# Patient Record
Sex: FEMALE | Race: WHITE | NOT HISPANIC OR LATINO | Employment: OTHER | URBAN - METROPOLITAN AREA
[De-identification: names, ages, dates, MRNs, and addresses within clinical notes are randomized per-mention and may not be internally consistent; named-entity substitution may affect disease eponyms.]

---

## 2022-05-08 ENCOUNTER — LAB REQUISITION (OUTPATIENT)
Dept: LAB | Facility: CLINIC | Age: 87
End: 2022-05-08
Payer: COMMERCIAL

## 2022-05-08 DIAGNOSIS — I10 ESSENTIAL (PRIMARY) HYPERTENSION: ICD-10-CM

## 2022-05-08 DIAGNOSIS — F33.0 MAJOR DEPRESSIVE DISORDER, RECURRENT, MILD (H): ICD-10-CM

## 2022-05-10 LAB
BASOPHILS # BLD AUTO: 0.1 10E3/UL (ref 0–0.2)
BASOPHILS NFR BLD AUTO: 1 %
EOSINOPHIL # BLD AUTO: 0.2 10E3/UL (ref 0–0.7)
EOSINOPHIL NFR BLD AUTO: 4 %
ERYTHROCYTE [DISTWIDTH] IN BLOOD BY AUTOMATED COUNT: 14.3 % (ref 10–15)
HCT VFR BLD AUTO: 44.8 % (ref 35–47)
HGB BLD-MCNC: 15.1 G/DL (ref 11.7–15.7)
IMM GRANULOCYTES # BLD: 0 10E3/UL
IMM GRANULOCYTES NFR BLD: 0 %
LYMPHOCYTES # BLD AUTO: 1.1 10E3/UL (ref 0.8–5.3)
LYMPHOCYTES NFR BLD AUTO: 21 %
MCH RBC QN AUTO: 32.8 PG (ref 26.5–33)
MCHC RBC AUTO-ENTMCNC: 33.7 G/DL (ref 31.5–36.5)
MCV RBC AUTO: 97 FL (ref 78–100)
MONOCYTES # BLD AUTO: 0.5 10E3/UL (ref 0–1.3)
MONOCYTES NFR BLD AUTO: 10 %
NEUTROPHILS # BLD AUTO: 3.5 10E3/UL (ref 1.6–8.3)
NEUTROPHILS NFR BLD AUTO: 64 %
NRBC # BLD AUTO: 0 10E3/UL
NRBC BLD AUTO-RTO: 0 /100
PLATELET # BLD AUTO: 205 10E3/UL (ref 150–450)
RBC # BLD AUTO: 4.6 10E6/UL (ref 3.8–5.2)
RETICS # AUTO: 0.07 10E6/UL (ref 0.01–0.11)
RETICS/RBC NFR AUTO: 1.6 % (ref 0.8–2.7)
WBC # BLD AUTO: 5.4 10E3/UL (ref 4–11)

## 2022-05-10 PROCEDURE — P9603 ONE-WAY ALLOW PRORATED MILES: HCPCS | Mod: ORL

## 2022-05-10 PROCEDURE — 85045 AUTOMATED RETICULOCYTE COUNT: CPT | Mod: ORL

## 2022-05-10 PROCEDURE — 85025 COMPLETE CBC W/AUTO DIFF WBC: CPT | Mod: ORL

## 2022-05-10 PROCEDURE — 36415 COLL VENOUS BLD VENIPUNCTURE: CPT | Mod: ORL

## 2022-05-11 LAB
PATH REPORT.COMMENTS IMP SPEC: NORMAL
PATH REPORT.COMMENTS IMP SPEC: NORMAL
PATH REPORT.FINAL DX SPEC: NORMAL
PATH REPORT.MICROSCOPIC SPEC OTHER STN: NORMAL
PATH REPORT.MICROSCOPIC SPEC OTHER STN: NORMAL
PATH REPORT.RELEVANT HX SPEC: NORMAL

## 2022-05-11 PROCEDURE — 85060 BLOOD SMEAR INTERPRETATION: CPT | Performed by: PATHOLOGY

## 2022-06-11 ENCOUNTER — HEALTH MAINTENANCE LETTER (OUTPATIENT)
Age: 87
End: 2022-06-11

## 2022-06-17 ENCOUNTER — TELEPHONE (OUTPATIENT)
Dept: GERIATRICS | Facility: CLINIC | Age: 87
End: 2022-06-17
Payer: COMMERCIAL

## 2022-06-17 NOTE — TELEPHONE ENCOUNTER
Medication/Dose: ONDANSETRON 8MG ODT. DISSOLVE ONE TABLET BETWEEN CHEEK EVERY 8 HOURS PRN    Insurance Name: BC/GHISLAINE HANCOCK  Insurance ID: [id number]  BIN: [id number]  PCN: [id number]  Insurance Phone Number: [phone]    Brady Kimbolton  Pharmacy Technician  [facility]  [address], [place] [address]  Phone: [phone]  Fax: [phone]

## 2022-06-17 NOTE — TELEPHONE ENCOUNTER
Prior Authorization Update-    Patient is not being following by [facility]. Medication is also not in the patient chart as there are no medications listed. Encounter has been closed and the sender/PA team has been informed to please contact the patient's current primary care team for follow-up.

## 2022-06-21 ENCOUNTER — ASSISTED LIVING VISIT (OUTPATIENT)
Dept: GERIATRICS | Facility: CLINIC | Age: 87
End: 2022-06-21
Payer: COMMERCIAL

## 2022-06-21 VITALS — HEIGHT: 64 IN

## 2022-06-21 DIAGNOSIS — G47.09 OTHER INSOMNIA: ICD-10-CM

## 2022-06-21 DIAGNOSIS — F33.9 DEPRESSION, RECURRENT (H): ICD-10-CM

## 2022-06-21 DIAGNOSIS — G47.33 OSA (OBSTRUCTIVE SLEEP APNEA): ICD-10-CM

## 2022-06-21 DIAGNOSIS — Z85.3 HX: BREAST CANCER: ICD-10-CM

## 2022-06-21 DIAGNOSIS — K58.9 IRRITABLE BOWEL SYNDROME, UNSPECIFIED TYPE: ICD-10-CM

## 2022-06-21 DIAGNOSIS — K21.00 GASTROESOPHAGEAL REFLUX DISEASE WITH ESOPHAGITIS WITHOUT HEMORRHAGE: ICD-10-CM

## 2022-06-21 DIAGNOSIS — I10 PRIMARY HYPERTENSION: ICD-10-CM

## 2022-06-21 DIAGNOSIS — R29.6 FALLS FREQUENTLY: ICD-10-CM

## 2022-06-21 DIAGNOSIS — R41.3 MEMORY PROBLEM: Primary | ICD-10-CM

## 2022-06-21 RX ORDER — SERTRALINE HYDROCHLORIDE 25 MG/1
25 TABLET, FILM COATED ORAL DAILY
COMMUNITY
Start: 2022-06-15 | End: 2022-06-24

## 2022-06-21 RX ORDER — ACETAMINOPHEN 500 MG
500-1000 TABLET ORAL
COMMUNITY
End: 2022-09-16

## 2022-06-21 RX ORDER — ONDANSETRON 8 MG/1
8 TABLET, ORALLY DISINTEGRATING ORAL EVERY 8 HOURS PRN
COMMUNITY
Start: 2022-06-05 | End: 2022-09-19

## 2022-06-21 RX ORDER — LANOLIN ALCOHOL/MO/W.PET/CERES
3 CREAM (GRAM) TOPICAL AT BEDTIME
COMMUNITY
Start: 2022-06-15 | End: 2022-06-24

## 2022-06-21 NOTE — LETTER
6/21/2022        RE: Yanelis David  [address]  [place] [address]        M [facility] GERIATRICS  INITIAL VISIT NOTE  June 21, 2022      PRIMARY CARE PROVIDER AND CLINIC:  Erica Shaw [address] [facility] / [place] [address]    M [facility] Medical Record Number:  [id number]  Place of Service where encounter took place:  [facility]) [[id number]]    Chief Complaint   Patient presents with     [facility] Care       HPI:    Yanelis David is a 89 year old  (1/22/1933) female was admitted to the above facility from [facility].      History obtained from: facility chart records, facility staff, patient report, [facility] chart review and Care Everywhere [facility] chart review.      HPI:  Patient in room with two daughters. Up independently but uses 2 wheel walker for longer distances. Pleasant with writer and reports she is healthy. Has history of depression, family is concerned regarding socialization and patient potentially staying in room. Taking sertraline 25 mg/day. With recent falls, has left pinky finger fracture. Is healing and pain is minimum.       CODE STATUS/ADVANCE DIRECTIVES: DNR / DNI    ALLERGIES:  Allergies   Allergen Reactions     Codeine      Other reaction(s): GI Upset     Contrast Dye Hives and Rash     Iodine Hives and Rash     Latex Rash     Other reaction(s): Intolerance-Can't Take  RED RASH, BANDAIDS OR TAPE OR GLOVES         PAST MEDICAL HISTORY:   Past Medical History:   Diagnosis Date     COPD (chronic obstructive pulmonary disease) (H) 9/4/2014     Depression, recurrent (H) 4/6/2021     DVT (deep venous thrombosis) (H) 8/9/2013    Formatting of this note might be different from the original. 30's with bilateral lower extremity, unclear if provoked vs unprovoked. She states that she had erythema nodosum in her 30's. She could not remember if she has superficial thrombophlebitis vs deep vein thrombosis.     Esophageal  reflux 12/31/2007    Formatting of this note might be different from the original. Dexilant before eating  On Prilosec.  Had upper endoscopy done showing polyp but no barretts     Generalized osteoarthritis 11/13/2008     Hyperlipidemia 12/31/2007    Formatting of this note might be different from the original. Diet controlled, on fish oil daily     Hypokalemia 5/11/2015     IBS (irritable bowel syndrome) 11/13/2008    Formatting of this note might be different from the original. Predominant diarrhea and constipation     Lumbago 4/29/2013     Malignant neoplasm of breast (H) 8/20/2014    Formatting of this note might be different from the original. right breast, s/p lumpectomy in 12/2013, on oral med.     Melanoma of skin (H) 4/20/2015     Memory problem 4/6/2021     SANTANA (obstructive sleep apnea) 8/5/2013    Formatting of this note might be different from the original. On CPAP     Other insomnia 9/10/2019     Pain in thoracic spine 4/29/2013       PAST SURGICAL HISTORY:   No past surgical history on file.    FAMILY HISTORY:   No family history on file.  Unable to review due to cognitive impairment- mild memory loss.     SOCIAL HISTORY:   Patient's living condition: assisted living     MEDICATIONS  Post Discharge Medication Reconciliation Status: discharge medications reconciled, continue medications without change.  Current Outpatient Medications   Medication Sig Dispense Refill     acetaminophen (TYLENOL) 500 MG tablet Take 500-1,000 mg by mouth       calcium carbonate 600 mg-vitamin D 400 units (CALCIUM CARB-CHOLECALCIFEROL) 600-400 MG-UNIT per tablet Take 1 tablet by mouth daily       melatonin 3 MG tablet Take 3 mg by mouth At Bedtime       ondansetron (ZOFRAN ODT) 8 MG ODT tab Take 8 mg by mouth every 8 hours as needed       sertraline (ZOLOFT) 25 MG tablet Take 25 mg by mouth daily         ROS:  10 point ROS neg other than the symptoms noted above in the HPI.  Unable to obtain due to cognitive impairment or  "aphasia  Review of Systems   All other systems reviewed and are negative.      PHYSICAL EXAM:  Ht 1.626 m (5' 4\")   Physical Exam  Vitals reviewed.   Constitutional:       Appearance: Normal appearance.   Cardiovascular:      Rate and Rhythm: Normal rate and regular rhythm.      Pulses: Normal pulses.      Heart sounds: Normal heart sounds.   Pulmonary:      Effort: Pulmonary effort is normal.      Breath sounds: Normal breath sounds.   Abdominal:      General: Bowel sounds are normal.      Palpations: Abdomen is soft.   Skin:     General: Skin is warm.   Neurological:      Mental Status: She is alert. Mental status is at baseline. She is confused.   Psychiatric:         Cognition and Memory: Cognition is impaired.          LABORATORY/IMAGING DATA:  Reviewed as per [email or web address] and/or [facility]    ASSESSMENT/PLAN:  Depression (R33.9)  Comment: longstanding history of depression. With self isolating.   Plan: Continue sertraline 25 mg/day. Monitor and update NP with changes.     Memory Problem (R41.3)  Comment: Chronic, mild cognitive impairment.   Plan: Continue AL support with medication, meals and safety. Expect further decline with progression of disease.     Insomnia (G47.09)  Comment: Longstanding history. Improved with medication.   Plan: no change at this time. Continue Melatonin 3 mg/HS    Breast Cancer (Z85.3)  Comment: right breast. S/P lumpectomy 12/2013.   Plan: Surveillance    SANTANA (G47.33)  Comment: on CPAP  Plan: Monitor     IBS (K58.9)  Comment: predominant diarrhea and constipation  Plan: Monitor, no change     GERD (K21.00)  Comment: Had upper endoscopy showing polyp but no barretts  Plan: PPI discontinued. Asymptomatic off meds    Frequent Falls (R29.6)   Comment: recent falls with left hand pinky fracture.   Plan: Continue OT/PT.         Orders:  CBC and BMP next lab day       Electronically signed by:  Erica Shaw NP                    Sincerely,        Erica Shaw NP    "

## 2022-06-21 NOTE — PROGRESS NOTES
[facility] GERIATRICS  INITIAL VISIT NOTE  June 21, 2022      PRIMARY CARE PROVIDER AND CLINIC:  Erica Shaw [address] [facility] [address]    [facility] Medical Record Number:  [id number]  [facility] [[id number]]    Chief Complaint   Patient presents with     [facility] Care       HPI:    Yanelis David is a 89 year old  (1/22/1933) female was admitted to the above facility from [facility].      History obtained from: facility chart records, facility staff, patient report, [facility] chart review and Care Everywhere [facility] chart review.      HPI:  Patient in room with two daughters. Up independently but uses 2 wheel walker for longer distances. Pleasant with writer and reports she is healthy. Has history of depression, family is concerned regarding socialization and patient potentially staying in room. Taking sertraline 25 mg/day. With recent falls, has left pinky finger fracture. Is healing and pain is minimum.       CODE STATUS/ADVANCE DIRECTIVES: DNR / DNI    ALLERGIES:  Allergies   Allergen Reactions     Codeine      Other reaction(s): GI Upset     Contrast Dye Hives and Rash     Iodine Hives and Rash     Latex Rash     Other reaction(s): Intolerance-Can't Take  RED RASH, BANDAIDS OR TAPE OR GLOVES         PAST MEDICAL HISTORY:   Past Medical History:   Diagnosis Date     COPD (chronic obstructive pulmonary disease) (H) 9/4/2014     Depression, recurrent (H) 4/6/2021     DVT (deep venous thrombosis) (H) 8/9/2013    Formatting of this note might be different from the original. 30's with bilateral lower extremity, unclear if provoked vs unprovoked. She states that she had erythema nodosum in her 30's. She could not remember if she has superficial thrombophlebitis vs deep vein thrombosis.     Esophageal reflux 12/31/2007    Formatting of this note might be different from the original. Dexilant before  eating  On Prilosec.  Had upper endoscopy done showing polyp but no barretts     Generalized osteoarthritis 11/13/2008     Hyperlipidemia 12/31/2007    Formatting of this note might be different from the original. Diet controlled, on fish oil daily     Hypokalemia 5/11/2015     IBS (irritable bowel syndrome) 11/13/2008    Formatting of this note might be different from the original. Predominant diarrhea and constipation     Lumbago 4/29/2013     Malignant neoplasm of breast (H) 8/20/2014    Formatting of this note might be different from the original. right breast, s/p lumpectomy in 12/2013, on oral med.     Melanoma of skin (H) 4/20/2015     Memory problem 4/6/2021     SANTANA (obstructive sleep apnea) 8/5/2013    Formatting of this note might be different from the original. On CPAP     Other insomnia 9/10/2019     Pain in thoracic spine 4/29/2013       PAST SURGICAL HISTORY:   No past surgical history on file.    FAMILY HISTORY:   No family history on file.  Unable to review due to cognitive impairment- mild memory loss.     SOCIAL HISTORY:   Patient's living condition: assisted living     MEDICATIONS  Post Discharge Medication Reconciliation Status: discharge medications reconciled, continue medications without change.  Current Outpatient Medications   Medication Sig Dispense Refill     acetaminophen (TYLENOL) 500 MG tablet Take 500-1,000 mg by mouth       calcium carbonate 600 mg-vitamin D 400 units (CALCIUM CARB-CHOLECALCIFEROL) 600-400 MG-UNIT per tablet Take 1 tablet by mouth daily       melatonin 3 MG tablet Take 3 mg by mouth At Bedtime       ondansetron (ZOFRAN ODT) 8 MG ODT tab Take 8 mg by mouth every 8 hours as needed       sertraline (ZOLOFT) 25 MG tablet Take 25 mg by mouth daily         ROS:  10 point ROS neg other than the symptoms noted above in the HPI.  Unable to obtain due to cognitive impairment or aphasia  Review of Systems   All other systems reviewed and are negative.      PHYSICAL EXAM:  Ht  "1.626 m (5' 4\")   Physical Exam  Vitals reviewed.   Constitutional:       Appearance: Normal appearance.   Cardiovascular:      Rate and Rhythm: Normal rate and regular rhythm.      Pulses: Normal pulses.      Heart sounds: Normal heart sounds.   Pulmonary:      Effort: Pulmonary effort is normal.      Breath sounds: Normal breath sounds.   Abdominal:      General: Bowel sounds are normal.      Palpations: Abdomen is soft.   Skin:     General: Skin is warm.   Neurological:      Mental Status: She is alert. Mental status is at baseline. She is confused.   Psychiatric:         Cognition and Memory: Cognition is impaired.          LABORATORY/IMAGING DATA:  Reviewed as per [facility] and/or [facility]    ASSESSMENT/PLAN:  Depression (R33.9)  Comment: longstanding history of depression. With self isolating.   Plan: Continue sertraline 25 mg/day. Monitor and update NP with changes.     Memory Problem (R41.3)  Comment: Chronic, mild cognitive impairment.   Plan: Continue AL support with medication, meals and safety. Expect further decline with progression of disease.     Insomnia (G47.09)  Comment: Longstanding history. Improved with medication.   Plan: no change at this time. Continue Melatonin 3 mg/HS    Breast Cancer (Z85.3)  Comment: right breast. S/P lumpectomy 12/2013.   Plan: Surveillance    SANTANA (G47.33)  Comment: on CPAP  Plan: Monitor     IBS (K58.9)  Comment: predominant diarrhea and constipation  Plan: Monitor, no change     GERD (K21.00)  Comment: Had upper endoscopy showing polyp but no barretts  Plan: PPI discontinued. Asymptomatic off meds    Frequent Falls (R29.6)   Comment: recent falls with left hand pinky fracture.   Plan: Continue OT/PT.         Orders:  CBC and BMP next lab day       Electronically signed by:  Erica Shaw NP              "

## 2022-06-24 ENCOUNTER — LAB REQUISITION (OUTPATIENT)
Dept: LAB | Facility: CLINIC | Age: 87
End: 2022-06-24
Payer: COMMERCIAL

## 2022-06-24 DIAGNOSIS — U07.1 COVID-19: ICD-10-CM

## 2022-06-24 DIAGNOSIS — F33.9 DEPRESSION, RECURRENT (H): Primary | ICD-10-CM

## 2022-06-24 DIAGNOSIS — G47.09 OTHER INSOMNIA: ICD-10-CM

## 2022-06-24 DIAGNOSIS — Z78.9 TAKES DIETARY SUPPLEMENTS: ICD-10-CM

## 2022-06-24 PROCEDURE — U0005 INFEC AGEN DETEC AMPLI PROBE: HCPCS | Mod: ORL | Performed by: INTERNAL MEDICINE

## 2022-06-24 RX ORDER — SERTRALINE HYDROCHLORIDE 25 MG/1
TABLET, FILM COATED ORAL
Qty: 90 TABLET | Refills: 97 | Status: SHIPPED | OUTPATIENT
Start: 2022-06-24 | End: 2022-07-26 | Stop reason: DRUGHIGH

## 2022-06-24 RX ORDER — LANOLIN ALCOHOL/MO/W.PET/CERES
CREAM (GRAM) TOPICAL
Qty: 90 TABLET | Refills: 97 | Status: SHIPPED | OUTPATIENT
Start: 2022-06-24

## 2022-06-24 RX ORDER — CALCIUM CARBONATE/VITAMIN D3 600 MG-10
TABLET ORAL
Qty: 90 TABLET | Refills: 97 | Status: SHIPPED | OUTPATIENT
Start: 2022-06-24 | End: 2023-06-30

## 2022-06-25 LAB — SARS-COV-2 RNA RESP QL NAA+PROBE: NEGATIVE

## 2022-06-26 ENCOUNTER — LAB REQUISITION (OUTPATIENT)
Dept: LAB | Facility: CLINIC | Age: 87
End: 2022-06-26
Payer: COMMERCIAL

## 2022-06-26 DIAGNOSIS — R79.9 ABNORMAL FINDING OF BLOOD CHEMISTRY, UNSPECIFIED: ICD-10-CM

## 2022-06-27 ENCOUNTER — LAB REQUISITION (OUTPATIENT)
Dept: LAB | Facility: CLINIC | Age: 87
End: 2022-06-27
Payer: COMMERCIAL

## 2022-06-27 DIAGNOSIS — U07.1 COVID-19: ICD-10-CM

## 2022-06-28 LAB
ANION GAP SERPL CALCULATED.3IONS-SCNC: 11 MMOL/L (ref 5–18)
BUN SERPL-MCNC: 18 MG/DL (ref 8–28)
CALCIUM SERPL-MCNC: 9.6 MG/DL (ref 8.5–10.5)
CHLORIDE BLD-SCNC: 105 MMOL/L (ref 98–107)
CO2 SERPL-SCNC: 23 MMOL/L (ref 22–31)
CREAT SERPL-MCNC: 0.91 MG/DL (ref 0.6–1.1)
ERYTHROCYTE [DISTWIDTH] IN BLOOD BY AUTOMATED COUNT: 13.5 % (ref 10–15)
GFR SERPL CREATININE-BSD FRML MDRD: 60 ML/MIN/1.73M2
GLUCOSE BLD-MCNC: 82 MG/DL (ref 70–125)
HCT VFR BLD AUTO: 45.6 % (ref 35–47)
HGB BLD-MCNC: 15.5 G/DL (ref 11.7–15.7)
MCH RBC QN AUTO: 32.4 PG (ref 26.5–33)
MCHC RBC AUTO-ENTMCNC: 34 G/DL (ref 31.5–36.5)
MCV RBC AUTO: 95 FL (ref 78–100)
PLATELET # BLD AUTO: 200 10E3/UL (ref 150–450)
POTASSIUM BLD-SCNC: 3.9 MMOL/L (ref 3.5–5)
RBC # BLD AUTO: 4.78 10E6/UL (ref 3.8–5.2)
SODIUM SERPL-SCNC: 139 MMOL/L (ref 136–145)
WBC # BLD AUTO: 6.4 10E3/UL (ref 4–11)

## 2022-06-28 PROCEDURE — 85027 COMPLETE CBC AUTOMATED: CPT | Mod: ORL | Performed by: NURSE PRACTITIONER

## 2022-06-28 PROCEDURE — 80048 BASIC METABOLIC PNL TOTAL CA: CPT | Mod: ORL | Performed by: NURSE PRACTITIONER

## 2022-06-28 PROCEDURE — P9603 ONE-WAY ALLOW PRORATED MILES: HCPCS | Mod: ORL | Performed by: NURSE PRACTITIONER

## 2022-06-28 PROCEDURE — 36415 COLL VENOUS BLD VENIPUNCTURE: CPT | Mod: ORL | Performed by: NURSE PRACTITIONER

## 2022-06-28 PROCEDURE — U0005 INFEC AGEN DETEC AMPLI PROBE: HCPCS | Mod: ORL | Performed by: INTERNAL MEDICINE

## 2022-06-29 LAB — SARS-COV-2 RNA RESP QL NAA+PROBE: NEGATIVE

## 2022-07-20 ENCOUNTER — ASSISTED LIVING VISIT (OUTPATIENT)
Dept: GERIATRICS | Facility: CLINIC | Age: 87
End: 2022-07-20
Payer: COMMERCIAL

## 2022-07-20 VITALS — TEMPERATURE: 97.4 F | HEIGHT: 64 IN

## 2022-07-20 DIAGNOSIS — I10 BENIGN ESSENTIAL HYPERTENSION: ICD-10-CM

## 2022-07-20 DIAGNOSIS — F03.B0 MODERATE DEMENTIA WITHOUT BEHAVIORAL DISTURBANCE (H): Primary | ICD-10-CM

## 2022-07-20 DIAGNOSIS — R13.10 DYSPHAGIA, UNSPECIFIED TYPE: ICD-10-CM

## 2022-07-20 DIAGNOSIS — J43.8 OTHER EMPHYSEMA (H): ICD-10-CM

## 2022-07-20 DIAGNOSIS — F32.0 CURRENT MILD EPISODE OF MAJOR DEPRESSIVE DISORDER WITHOUT PRIOR EPISODE (H): ICD-10-CM

## 2022-07-20 NOTE — LETTER
"    2022        RE: Yanelis David  [address]  [place] [address]        M [facility] GERIATRICS  ACUTE/EPISODIC VISIT    [facility] Medical Record Number:  [id number]  Place of Service where encounter took place:  [facility] ([facility]) [[id number]]    Chief Complaint   Patient presents with     RECHECK       HPI:    Yanelis Dvaid is a 89 year old  (1933), who is being seen today for an episodic care visit.  HPI information obtained from: facility chart records, facility staff and patient report.    Today's concern is: Patient eating breakfast alone in room, just has gotten up for the day. Per staff, she had a self reported fall without injury. Unable to recall any details for writer. Family concerned that she is more depressed. Today, reported her spouse has  3-4 weeks ago. Having difficulty thinking clearly and noted \"I just can't think today\". Family requesting speech therapy as she was suppose to get a swallow evaluation due to coughing while eating, but this was discontinued due to fall and placement into [place] or [place]. She is denying this service and feels she has no swallow problems and wouldn't change anything if she did. Denies CP, SOB or lightheadedness.     BP Readings from Last 3 Encounters:   No data found for BP     Pulse Readings from Last 4 Encounters:   No data found for Pulse     Wt Readings from Last 4 Encounters:   No data found for Wt            ALLERGIES:    Allergies   Allergen Reactions     Codeine      Other reaction(s): GI Upset     Contrast Dye Hives and Rash     Iodine Hives and Rash     Latex Rash     Other reaction(s): Intolerance-Can't Take  RED RASH, BANDAIDS OR TAPE OR GLOVES          MEDICATIONS:  Post Discharge Medication Reconciliation Status: patient was not discharged from an inpatient facility or TCU.     Current Outpatient Medications   Medication Sig Dispense Refill     acetaminophen (TYLENOL) 500 MG tablet Take " "500-1,000 mg by mouth       CALCIUM + VITAMIN D3 600-10 MG-MCG TABS per tablet TAKE 1 TABLET BY MOUTH ONCE DAILY 90 tablet 97     melatonin 3 MG tablet TAKE 1 TABLET BY MOUTH AT BEDTIME 90 tablet 97     ondansetron (ZOFRAN ODT) 8 MG ODT tab Take 8 mg by mouth every 8 hours as needed       sertraline (ZOLOFT) 25 MG tablet TAKE 1 TABLET BY MOUTH ONCE DAILY 90 tablet 97     Medications reviewed:  Medications reconciled to facility chart and changes were made to reflect current medications as identified as above med list. Below are the changes that were made:   Medications stopped since last EPIC medication reconciliation:   There are no discontinued medications.    Medications started since last [facility] medication reconciliation:  No orders of the defined types were placed in this encounter.        REVIEW OF SYSTEMS:  4 point ROS neg other than the symptoms noted above in the HPI.  Review of Systems   Neurological:        Reports confusion       PHYSICAL EXAM:  Temp 97.4  F (36.3  C)   Ht 1.626 m (5' 4\")   Physical Exam  Vitals reviewed.   Cardiovascular:      Rate and Rhythm: Normal rate.   Pulmonary:      Effort: Pulmonary effort is normal.      Breath sounds: Normal breath sounds.   Skin:     General: Skin is warm.   Neurological:      Mental Status: She is alert. She is confused.   Psychiatric:         Mood and Affect: Affect is flat.           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   Plan:   -Continue AL support with medication administration, meals and safety.   -expect further decline with progression of disease     (J43.8) Other emphysema (H)  Comment: Chronic, without recent illness.   Plan:   - Monitor     (F32.0) Current mild episode of major depressive disorder without prior episode (H)  Comment: Longstanding; increased weepiness   Plan:   -Increase sertraline 50 " mg/day     (R13.10) Dysphagia, unspecified type  Comment: Undiagnosed. Coughs with eating.   Plan:   -Refused speech therapy.     (110) HTN  Comment: Not managed, hypertensive without symptoms. Not stable.   144/95  Plan: Lisinopril 2.5 mg/day       Orders:  1. Lisinopril 2/5 mg/day  2. Sertraline 50 mg/day   3. BP daily x2 weeks    Electronically signed by  Eirca Shaw NP                Sincerely,        Erica Shaw NP

## 2022-07-20 NOTE — PROGRESS NOTES
"[facility] GERIATRICS  ACUTE/EPISODIC VISIT    [facility] Medical Record Number:  [id number]  Place of Service where encounter took place:  [facility] ([facility]) [[id number]]    Chief Complaint   Patient presents with     RECHECK       HPI:    Yanelis David is a 89 year old  (1933), who is being seen today for an episodic care visit.  HPI information obtained from: facility chart records, facility staff and patient report.    Today's concern is: Patient eating breakfast alone in room, just has gotten up for the day. Per staff, she had a self reported fall without injury. Unable to recall any details for writer. Family concerned that she is more depressed. Today, reported her spouse has  3-4 weeks ago. Having difficulty thinking clearly and noted \"I just can't think today\". Family requesting speech therapy as she was suppose to get a swallow evaluation due to coughing while eating, but this was discontinued due to fall and placement into [place] or [place]. She is denying this service and feels she has no swallow problems and wouldn't change anything if she did. Denies CP, SOB or lightheadedness.     BP Readings from Last 3 Encounters:   No data found for BP     Pulse Readings from Last 4 Encounters:   No data found for Pulse     Wt Readings from Last 4 Encounters:   No data found for Wt            ALLERGIES:    Allergies   Allergen Reactions     Codeine      Other reaction(s): GI Upset     Contrast Dye Hives and Rash     Iodine Hives and Rash     Latex Rash     Other reaction(s): Intolerance-Can't Take  RED RASH, BANDAIDS OR TAPE OR GLOVES          MEDICATIONS:  Post Discharge Medication Reconciliation Status: patient was not discharged from an inpatient facility or TCU.     Current Outpatient Medications   Medication Sig Dispense Refill     acetaminophen (TYLENOL) 500 MG tablet Take 500-1,000 mg by mouth       CALCIUM + VITAMIN D3 600-10 MG-MCG TABS per tablet TAKE 1 TABLET BY " "MOUTH ONCE DAILY 90 tablet 97     melatonin 3 MG tablet TAKE 1 TABLET BY MOUTH AT BEDTIME 90 tablet 97     ondansetron (ZOFRAN ODT) 8 MG ODT tab Take 8 mg by mouth every 8 hours as needed       sertraline (ZOLOFT) 25 MG tablet TAKE 1 TABLET BY MOUTH ONCE DAILY 90 tablet 97     Medications reviewed:  Medications reconciled to facility chart and changes were made to reflect current medications as identified as above med list. Below are the changes that were made:   Medications stopped since last EPIC medication reconciliation:   There are no discontinued medications.    Medications started since last [facility] medication reconciliation:  No orders of the defined types were placed in this encounter.        REVIEW OF SYSTEMS:  4 point ROS neg other than the symptoms noted above in the HPI.  Review of Systems   Neurological:        Reports confusion       PHYSICAL EXAM:  Temp 97.4  F (36.3  C)   Ht 1.626 m (5' 4\")   Physical Exam  Vitals reviewed.   Cardiovascular:      Rate and Rhythm: Normal rate.   Pulmonary:      Effort: Pulmonary effort is normal.      Breath sounds: Normal breath sounds.   Skin:     General: Skin is warm.   Neurological:      Mental Status: She is alert. She is confused.   Psychiatric:         Mood and Affect: Affect is flat.           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   Plan:   -Continue AL support with medication administration, meals and safety.   -expect further decline with progression of disease     (J43.8) Other emphysema (H)  Comment: Chronic, without recent illness.   Plan:   - Monitor     (F32.0) Current mild episode of major depressive disorder without prior episode (H)  Comment: Longstanding; increased weepiness   Plan:   -Increase sertraline 50 mg/day     (R13.10) Dysphagia, unspecified type  Comment: Undiagnosed. Coughs with eating.   Plan: "   -Refused speech therapy.     (110) HTN  Comment: Not managed, hypertensive without symptoms. Not stable.   144/95  Plan: Lisinopril 2.5 mg/day       Orders:  1. Lisinopril 2/5 mg/day  2. Sertraline 50 mg/day   3. BP daily x2 weeks    Electronically signed by  Erica Shaw NP

## 2022-07-21 DIAGNOSIS — I10 BENIGN ESSENTIAL HYPERTENSION: Primary | ICD-10-CM

## 2022-07-21 DIAGNOSIS — F33.9 DEPRESSION, RECURRENT (H): ICD-10-CM

## 2022-07-22 PROCEDURE — 87086 URINE CULTURE/COLONY COUNT: CPT | Mod: ORL | Performed by: NURSE PRACTITIONER

## 2022-07-22 PROCEDURE — 81001 URINALYSIS AUTO W/SCOPE: CPT | Mod: ORL | Performed by: NURSE PRACTITIONER

## 2022-07-22 RX ORDER — LISINOPRIL 2.5 MG/1
TABLET ORAL
Qty: 90 TABLET | Refills: 97 | Status: SHIPPED | OUTPATIENT
Start: 2022-07-22 | End: 2022-11-30

## 2022-07-23 ENCOUNTER — LAB REQUISITION (OUTPATIENT)
Dept: LAB | Facility: CLINIC | Age: 87
End: 2022-07-23
Payer: COMMERCIAL

## 2022-07-23 DIAGNOSIS — N39.0 URINARY TRACT INFECTION, SITE NOT SPECIFIED: ICD-10-CM

## 2022-07-23 LAB
ALBUMIN UR-MCNC: NEGATIVE MG/DL
APPEARANCE UR: ABNORMAL
BILIRUB UR QL STRIP: NEGATIVE
COLOR UR AUTO: ABNORMAL
GLUCOSE UR STRIP-MCNC: NEGATIVE MG/DL
HGB UR QL STRIP: NEGATIVE
KETONES UR STRIP-MCNC: NEGATIVE MG/DL
LEUKOCYTE ESTERASE UR QL STRIP: ABNORMAL
NITRATE UR QL: NEGATIVE
PH UR STRIP: 5.5 [PH] (ref 5–7)
RBC URINE: 0 /HPF
SP GR UR STRIP: 1.03 (ref 1–1.03)
UROBILINOGEN UR STRIP-MCNC: NORMAL MG/DL
WBC URINE: 0 /HPF

## 2022-07-24 LAB — BACTERIA UR CULT: NORMAL

## 2022-07-26 PROBLEM — F03.B0 MODERATE DEMENTIA WITHOUT BEHAVIORAL DISTURBANCE (H): Status: ACTIVE | Noted: 2022-07-26

## 2022-07-26 RX ORDER — LISINOPRIL 2.5 MG/1
2.5 TABLET ORAL DAILY
Start: 2022-07-26 | End: 2022-07-26

## 2022-08-23 ENCOUNTER — ASSISTED LIVING VISIT (OUTPATIENT)
Dept: GERIATRICS | Facility: CLINIC | Age: 87
End: 2022-08-23
Payer: COMMERCIAL

## 2022-08-23 VITALS
HEART RATE: 90 BPM | RESPIRATION RATE: 18 BRPM | HEIGHT: 64 IN | TEMPERATURE: 97.6 F | SYSTOLIC BLOOD PRESSURE: 100 MMHG | DIASTOLIC BLOOD PRESSURE: 67 MMHG

## 2022-08-23 DIAGNOSIS — M62.81 GENERALIZED MUSCLE WEAKNESS: ICD-10-CM

## 2022-08-23 DIAGNOSIS — R05.9 COUGH: Primary | ICD-10-CM

## 2022-08-23 DIAGNOSIS — F03.B0 MODERATE DEMENTIA WITHOUT BEHAVIORAL DISTURBANCE (H): ICD-10-CM

## 2022-08-23 NOTE — LETTER
8/23/2022        RE: Yanelis David  [address]  [place] [address]        [facility] GERIATRICS  ACUTE/EPISODIC VISIT    [facility] Medical Record Number:  [id number]  [facility] [[id number]]    Chief Complaint   Patient presents with     RECHECK       HPI:    Yanelis David is a 89 year old  (1/22/1933), who is being seen today for an episodic care visit.  HPI information obtained from: facility chart records, facility staff and patient report.    Today's concern is: Per nursing staff and documentation, patient has been coughing with meals, especially coffee. This is somewhat worse and progressing. Functional and cognitive decline noted with increased weakness. Family requesting physical, occupational and speech therapy. HPI difficult to obtain 2/2 severe cognitive impairment.     BP Readings from Last 3 Encounters:   08/31/22 129/77   08/23/22 100/67     Pulse Readings from Last 4 Encounters:   08/31/22 90   08/23/22 90     Wt Readings from Last 4 Encounters:   08/31/22 80.4 kg (177 lb 3.2 oz)       ALLERGIES:    Allergies   Allergen Reactions     Codeine      Other reaction(s): GI Upset     Contrast Dye Hives and Rash     Iodine Hives and Rash     Latex Rash     Other reaction(s): Intolerance-Can't Take  RED RASH, BANDAIDS OR TAPE OR GLOVES          MEDICATIONS:  Post Discharge Medication Reconciliation Status: patient was not discharged from an inpatient facility or TCU.     Current Outpatient Medications   Medication Sig Dispense Refill     acetaminophen (TYLENOL) 500 MG tablet Take 500-1,000 mg by mouth       CALCIUM + VITAMIN D3 600-10 MG-MCG TABS per tablet TAKE 1 TABLET BY MOUTH ONCE DAILY 90 tablet 97     lisinopril (ZESTRIL) 2.5 MG tablet TAKE 1 TABLET BY MOUTH ONCE DAILY 90 tablet 97     melatonin 3 MG tablet TAKE 1 TABLET BY MOUTH AT BEDTIME 90 tablet 97     ondansetron (ZOFRAN ODT) 8 MG ODT tab Take 8  "mg by mouth every 8 hours as needed       sertraline (ZOLOFT) 50 MG tablet TAKE 1 TABLET BY MOUTH ONCE DAILY 90 tablet 97     Medications reviewed:  Medications reconciled to facility chart and changes were made to reflect current medications as identified as above med list. Below are the changes that were made:   Medications stopped since last EPIC medication reconciliation:   There are no discontinued medications.    Medications started since last [facility] medication reconciliation:  No orders of the defined types were placed in this encounter.    REVIEW OF SYSTEMS:  4 point ROS neg other than the symptoms noted above in the HPI.  Unable to be obtained due to cognitive impairment or aphasia.   Review of Systems   All other systems reviewed and are negative.      PHYSICAL EXAM:  /67   Pulse 90   Temp 97.6  F (36.4  C)   Resp 18   Ht 1.626 m (5' 4\")   Physical Exam  Cardiovascular:      Rate and Rhythm: Normal rate and regular rhythm.      Pulses: Normal pulses.      Heart sounds: Normal heart sounds.   Abdominal:      General: Bowel sounds are normal.      Palpations: Abdomen is soft.   Musculoskeletal:      Comments: Generalized weakness     Skin:     General: Skin is warm.   Neurological:      Mental Status: She is alert.   Psychiatric:         Mood and Affect: Mood normal.         ASSESSMENT / PLAN:    ICD-10-CM    1. Cough  R05.9    2. Generalized muscle weakness  M62.81    3. Moderate dementia without behavioral disturbance (H)  F03.90      Cognitive impairment chronic and progressing. Now demonstrating functional and cognitive decline. Coughing with meals and increased weakness.     Occupational therapy    Physical therapy     Speech therapy       Orders:  OT/PT/SP    Electronically signed by  Erica Shaw NP      Documentation of Face to Face and Certification for Home Health Services    I certify that patient: Yanelis David is under my care and that I, or a nurse practitioner or physician's " assistant working with me, had a face-to-face encounter that meets the physician face-to-face encounter requirements with this patient on: 8/23/2022.    This encounter with the patient was in whole, or in part, for the following medical condition, which is the primary reason for home health care: Cognitive and functional decline with marked weakness and coughing with meals.    I certify that, based on my findings, the following services are medically necessary home health services: Occupational Therapy, Physical Therapy and Speech Language Therapy.    My clinical findings support the need for the above services because: Occupational Therapy Services are needed to assess and treat cognitive ability and address ADL safety due to impairment in weakness., Physical Therapy Services are needed to assess and treat the following functional impairments: fucntional decline, weakness. and Speech Therapy Services are needed to assess and treat impairments in language and/or swallow functions due to coughing with oral intake.    Further, I certify that my clinical findings support that this patient is homebound (i.e. absences from home require considerable and taxing effort and are for medical reasons or Sabianist services or infrequently or of short duration when for other reasons) because: Requires assistance of another person or specialized equipment to access medical services because patient: Requires supervision of another for safe transfer...    Based on the above findings. I certify that this patient is confined to the home and needs intermittent skilled nursing care, physical therapy and/or speech therapy.  The patient is under my care, and I have initiated the establishment of the plan of care.  This patient will be followed by a physician who will periodically review the plan of care.  Physician/Provider to provide follow up care: Erica Shaw    Attending hospital physician (the Medicare certified [place] provider):  Erica Shaw NP  Physician Signature: See electronic signature associated with these discharge orders.  Date: 9/4/2022                Sincerely,        Erica Shaw NP

## 2022-08-23 NOTE — PROGRESS NOTES
[facility] GERIATRICS  ACUTE/EPISODIC VISIT    [facility] Medical Record Number:  [id number]  Place of Service where encounter took place:  [facility] [[id number]]    Chief Complaint   Patient presents with     RECHECK       HPI:    Yanelis David is a 89 year old  (1/22/1933), who is being seen today for an episodic care visit.  HPI information obtained from: facility chart records, facility staff and patient report.    Today's concern is: Per nursing staff and documentation, patient has been coughing with meals, especially coffee. This is somewhat worse and progressing. Functional and cognitive decline noted with increased weakness. Family requesting physical, occupational and speech therapy. HPI difficult to obtain 2/2 severe cognitive impairment.     BP Readings from Last 3 Encounters:   08/31/22 129/77   08/23/22 100/67     Pulse Readings from Last 4 Encounters:   08/31/22 90   08/23/22 90     Wt Readings from Last 4 Encounters:   08/31/22 80.4 kg (177 lb 3.2 oz)       ALLERGIES:    Allergies   Allergen Reactions     Codeine      Other reaction(s): GI Upset     Contrast Dye Hives and Rash     Iodine Hives and Rash     Latex Rash     Other reaction(s): Intolerance-Can't Take  RED RASH, BANDAIDS OR TAPE OR GLOVES          MEDICATIONS:  Post Discharge Medication Reconciliation Status: patient was not discharged from an inpatient facility or TCU.     Current Outpatient Medications   Medication Sig Dispense Refill     acetaminophen (TYLENOL) 500 MG tablet Take 500-1,000 mg by mouth       CALCIUM + VITAMIN D3 600-10 MG-MCG TABS per tablet TAKE 1 TABLET BY MOUTH ONCE DAILY 90 tablet 97     lisinopril (ZESTRIL) 2.5 MG tablet TAKE 1 TABLET BY MOUTH ONCE DAILY 90 tablet 97     melatonin 3 MG tablet TAKE 1 TABLET BY MOUTH AT BEDTIME 90 tablet 97     ondansetron (ZOFRAN ODT) 8 MG ODT tab Take 8 mg by mouth every 8 hours as needed       sertraline (ZOLOFT) 50 MG tablet TAKE 1 TABLET BY MOUTH  "ONCE DAILY 90 tablet 97     Medications reviewed:  Medications reconciled to facility chart and changes were made to reflect current medications as identified as above med list. Below are the changes that were made:   Medications stopped since last EPIC medication reconciliation:   There are no discontinued medications.    Medications started since last [facility] medication reconciliation:  No orders of the defined types were placed in this encounter.    REVIEW OF SYSTEMS:  4 point ROS neg other than the symptoms noted above in the HPI.  Unable to be obtained due to cognitive impairment or aphasia.   Review of Systems   All other systems reviewed and are negative.      PHYSICAL EXAM:  /67   Pulse 90   Temp 97.6  F (36.4  C)   Resp 18   Ht 1.626 m (5' 4\")   Physical Exam  Cardiovascular:      Rate and Rhythm: Normal rate and regular rhythm.      Pulses: Normal pulses.      Heart sounds: Normal heart sounds.   Abdominal:      General: Bowel sounds are normal.      Palpations: Abdomen is soft.   Musculoskeletal:      Comments: Generalized weakness     Skin:     General: Skin is warm.   Neurological:      Mental Status: She is alert.   Psychiatric:         Mood and Affect: Mood normal.         ASSESSMENT / PLAN:    ICD-10-CM    1. Cough  R05.9    2. Generalized muscle weakness  M62.81    3. Moderate dementia without behavioral disturbance (H)  F03.90      Cognitive impairment chronic and progressing. Now demonstrating functional and cognitive decline. Coughing with meals and increased weakness.     Occupational therapy    Physical therapy     Speech therapy       Orders:  OT/PT/SP    Electronically signed by  Erica Shaw NP      Documentation of Face to Face and Certification for Home Health Services    I certify that patient: Yanelis David is under my care and that I, or a nurse practitioner or physician's assistant working with me, had a face-to-face encounter that meets the physician face-to-face " encounter requirements with this patient on: 8/23/2022.    This encounter with the patient was in whole, or in part, for the following medical condition, which is the primary reason for home health care: Cognitive and functional decline with marked weakness and coughing with meals.    I certify that, based on my findings, the following services are medically necessary home health services: Occupational Therapy, Physical Therapy and Speech Language Therapy.    My clinical findings support the need for the above services because: Occupational Therapy Services are needed to assess and treat cognitive ability and address ADL safety due to impairment in weakness., Physical Therapy Services are needed to assess and treat the following functional impairments: fucntional decline, weakness. and Speech Therapy Services are needed to assess and treat impairments in language and/or swallow functions due to coughing with oral intake.    Further, I certify that my clinical findings support that this patient is homebound (i.e. absences from home require considerable and taxing effort and are for medical reasons or Alevism services or infrequently or of short duration when for other reasons) because: Requires assistance of another person or specialized equipment to access medical services because patient: Requires supervision of another for safe transfer...    Based on the above findings. I certify that this patient is confined to the home and needs intermittent skilled nursing care, physical therapy and/or speech therapy.  The patient is under my care, and I have initiated the establishment of the plan of care.  This patient will be followed by a physician who will periodically review the plan of care.  Physician/Provider to provide follow up care: Erica Shaw    Attending hospital physician (the Medicare certified [place] provider): Erica Shaw NP  Physician Signature: See electronic signature associated with these  discharge orders.  Date: 9/4/2022

## 2022-08-26 ENCOUNTER — DOCUMENTATION ONLY (OUTPATIENT)
Dept: OTHER | Facility: CLINIC | Age: 87
End: 2022-08-26

## 2022-08-31 ENCOUNTER — ASSISTED LIVING VISIT (OUTPATIENT)
Dept: GERIATRICS | Facility: CLINIC | Age: 87
End: 2022-08-31
Payer: COMMERCIAL

## 2022-08-31 VITALS
TEMPERATURE: 97.8 F | DIASTOLIC BLOOD PRESSURE: 77 MMHG | BODY MASS INDEX: 30.25 KG/M2 | WEIGHT: 177.2 LBS | HEART RATE: 90 BPM | SYSTOLIC BLOOD PRESSURE: 129 MMHG | RESPIRATION RATE: 19 BRPM | HEIGHT: 64 IN | OXYGEN SATURATION: 91 %

## 2022-08-31 DIAGNOSIS — M62.81 GENERALIZED MUSCLE WEAKNESS: ICD-10-CM

## 2022-08-31 DIAGNOSIS — I10 BENIGN ESSENTIAL HYPERTENSION: Primary | ICD-10-CM

## 2022-08-31 DIAGNOSIS — F03.B0 MODERATE DEMENTIA WITHOUT BEHAVIORAL DISTURBANCE (H): ICD-10-CM

## 2022-08-31 DIAGNOSIS — F33.9 DEPRESSION, RECURRENT (H): ICD-10-CM

## 2022-08-31 DIAGNOSIS — M54.9 ACUTE BACK PAIN, UNSPECIFIED BACK LOCATION, UNSPECIFIED BACK PAIN LATERALITY: ICD-10-CM

## 2022-08-31 DIAGNOSIS — G47.09 OTHER INSOMNIA: ICD-10-CM

## 2022-08-31 NOTE — LETTER
"    8/31/2022        RE: Yanelis David  [address]  [place] [address]        Yanelis David is a 89 year old female seen August 31, 2022 at [facility] where she has resided for 2 months (admit 6/2022) seen for initial visit.   Pt is seen in her apartment up to recliner, with caregiver present.    Ambulatory with 4WW in her apartment, uses cane or WC when she goes out with family.   Has PCA for morning and evening cares.     Nursing staff had reported back pain, her daughter Sophia said someone came and did an adjustment  Pt states:  \"I feel fine\"   No back pain today.    \"My memory isn't good.\"  Only complaint on today's visit.   By chart review, pt has a h/o HTN, depression, GERD, SANTANA and IBS.  She was dx'd with breast cancer in 2013, s/p lumpectomy and oral adjuvant therapy.   Memory loss noted in 2021. Pt had been living with her  in [place] until his death in March 2022.  Moved to [place] to be closer to family.          Past Medical History:   Diagnosis Date     COPD (chronic obstructive pulmonary disease) (H) 9/4/2014     Depression, recurrent (H) 4/6/2021     DVT (deep venous thrombosis) (H) 8/9/2013    Formatting of this note might be different from the original. 30's with bilateral lower extremity, unclear if provoked vs unprovoked. She states that she had erythema nodosum in her 30's. She could not remember if she has superficial thrombophlebitis vs deep vein thrombosis.     Esophageal reflux 12/31/2007    Formatting of this note might be different from the original. Dexilant before eating  On Prilosec.  Had upper endoscopy done showing polyp but no barretts     Generalized osteoarthritis 11/13/2008     Hyperlipidemia 12/31/2007    Formatting of this note might be different from the original. Diet controlled, on fish oil daily     Hypokalemia 5/11/2015     IBS (irritable bowel syndrome) 11/13/2008    Formatting of this note might be different from the original. " "Predominant diarrhea and constipation     Lumbago 2013     Malignant neoplasm of breast (H) 2014    Formatting of this note might be different from the original. right breast, s/p lumpectomy in 2013, on oral med.     Melanoma of skin (H) 2015     Memory problem 2021     SANTANA (obstructive sleep apnea) 2013    Formatting of this note might be different from the original. On CPAP     Other insomnia 9/10/2019     Pain in thoracic spine 2013     SH:  Previously lived at [facility] in [place]      Two daughters, two sons, all live locally     and sister  in 2022  Daughter Sophia called during visit.     ROS: [place], has hearing aids but can't get them in herself    Ambulatory with 4WW, needs assist for ADLs      EXAM: NAD  /77   Pulse 90   Temp 97.8  F (36.6  C)   Resp 19   Ht 1.626 m (5' 4\")   Wt 80.4 kg (177 lb 3.2 oz)   SpO2 91%   BMI 30.42 kg/m     Neck supple without adenopathy  Lungs decreased BS right base, clear left  Heart RRR  Ext with trace edema  +ulnar deformities bilaterally   Neuro: some disorientation, STML.   Ambulates with 4WW, steady gait, slow to rise from chair   Psych: affect pleasant     Last Comprehensive Metabolic Panel:  Sodium   Date Value Ref Range Status   2022 139 136 - 145 mmol/L Final     Potassium   Date Value Ref Range Status   2022 3.9 3.5 - 5.0 mmol/L Final     Chloride   Date Value Ref Range Status   2022 105 98 - 107 mmol/L Final     Carbon Dioxide (CO2)   Date Value Ref Range Status   2022 23 22 - 31 mmol/L Final     Anion Gap   Date Value Ref Range Status   2022 11 5 - 18 mmol/L Final     Glucose   Date Value Ref Range Status   2022 82 70 - 125 mg/dL Final     Urea Nitrogen   Date Value Ref Range Status   2022 18 8 - 28 mg/dL Final     Creatinine   Date Value Ref Range Status   2022 0.91 0.60 - 1.10 mg/dL Final     GFR Estimate   Date Value Ref Range Status "   06/28/2022 60 (L) >60 mL/min/1.73m2 Final     Comment:     Effective December 21, 2021 eGFRcr in adults is calculated using the 2021 CKD-EPI creatinine equation which includes age and gender (Sera et al., NEJM, DOI: 10.1056/EHPItb0700160)     Calcium   Date Value Ref Range Status   06/28/2022 9.6 8.5 - 10.5 mg/dL Final     Lab Results   Component Value Date    WBC 6.4 06/28/2022      HGB 15.5 06/28/2022      MCV 95 06/28/2022       06/28/2022        IMP/PLAN:   (I10) Benign essential hypertension  Comment: good control   Plan: lisinopril 2.5 mg/day        (F33.9) Depression, recurrent (H)  Comment: by hx  Plan: sertraline 50 mg/day       (M54.9) Acute back pain, unspecified back location, unspecified back pain laterality  (M62.81) Generalized muscle weakness  Comment: pain improved per pt today    Plan: [facility] PHYSICAL THERAPY / OCCUPATIONAL THERAPY for transfers, gait, strengthening  Acetaminophen PRN     (F03.90) Moderate dementia without behavioral disturbance  Comment: cognitive and physical decline over past year   Plan: AL support for med admin, meals, activity; along with family and PCA assistance       (G47.09) Other insomnia  Comment: improved   Plan: melatonin 3 mg/HS       Bridgett Carreon MD         Sincerely,        Bridgett Carreon MD

## 2022-08-31 NOTE — PROGRESS NOTES
"Yanelis David is a 89 year old female seen August 31, 2022 at [facility] where she has resided for 2 months (admit 6/2022) seen for initial visit.   Pt is seen in her apartment up to recliner, with caregiver present.    Ambulatory with 4WW in her apartment, uses cane or WC when she goes out with family.   Has PCA for morning and evening cares.     Nursing staff had reported back pain, her daughter Sophia said someone came and did an adjustment  Pt states:  \"I feel fine\"   No back pain today.    \"My memory isn't good.\"  Only complaint on today's visit.   By chart review, pt has a h/o HTN, depression, GERD, SANTANA and IBS.  She was dx'd with breast cancer in 2013, s/p lumpectomy and oral adjuvant therapy.   Memory loss noted in 2021. Pt had been living with her  in [place] until his death in March 2022.  Moved to [place] to be closer to family.          Past Medical History:   Diagnosis Date     COPD (chronic obstructive pulmonary disease) (H) 9/4/2014     Depression, recurrent (H) 4/6/2021     DVT (deep venous thrombosis) (H) 8/9/2013    Formatting of this note might be different from the original. 30's with bilateral lower extremity, unclear if provoked vs unprovoked. She states that she had erythema nodosum in her 30's. She could not remember if she has superficial thrombophlebitis vs deep vein thrombosis.     Esophageal reflux 12/31/2007    Formatting of this note might be different from the original. Dexilant before eating  On Prilosec.  Had upper endoscopy done showing polyp but no barretts     Generalized osteoarthritis 11/13/2008     Hyperlipidemia 12/31/2007    Formatting of this note might be different from the original. Diet controlled, on fish oil daily     Hypokalemia 5/11/2015     IBS (irritable bowel syndrome) 11/13/2008    Formatting of this note might be different from the original. Predominant diarrhea and constipation     Lumbago 4/29/2013     Malignant neoplasm of breast (H) " "2014    Formatting of this note might be different from the original. right breast, s/p lumpectomy in 2013, on oral med.     Melanoma of skin (H) 2015     Memory problem 2021     SANTANA (obstructive sleep apnea) 2013    Formatting of this note might be different from the original. On CPAP     Other insomnia 9/10/2019     Pain in thoracic spine 2013     SH:  Previously lived at [facility] in [place]      Two daughters, two sons, all live locally     and sister  in 2022  Daughter Sophia called during visit.     ROS: [place], has hearing aids but can't get them in herself    Ambulatory with 4WW, needs assist for ADLs      EXAM: NAD  /77   Pulse 90   Temp 97.8  F (36.6  C)   Resp 19   Ht 1.626 m (5' 4\")   Wt 80.4 kg (177 lb 3.2 oz)   SpO2 91%   BMI 30.42 kg/m     Neck supple without adenopathy  Lungs decreased BS right base, clear left  Heart RRR  Ext with trace edema  +ulnar deformities bilaterally   Neuro: some disorientation, STML.   Ambulates with 4WW, steady gait, slow to rise from chair   Psych: affect pleasant     Last Comprehensive Metabolic Panel:  Sodium   Date Value Ref Range Status   2022 139 136 - 145 mmol/L Final     Potassium   Date Value Ref Range Status   2022 3.9 3.5 - 5.0 mmol/L Final     Chloride   Date Value Ref Range Status   2022 105 98 - 107 mmol/L Final     Carbon Dioxide (CO2)   Date Value Ref Range Status   2022 23 22 - 31 mmol/L Final     Anion Gap   Date Value Ref Range Status   2022 11 5 - 18 mmol/L Final     Glucose   Date Value Ref Range Status   2022 82 70 - 125 mg/dL Final     Urea Nitrogen   Date Value Ref Range Status   2022 18 8 - 28 mg/dL Final     Creatinine   Date Value Ref Range Status   2022 0.91 0.60 - 1.10 mg/dL Final     GFR Estimate   Date Value Ref Range Status   2022 60 (L) >60 mL/min/1.73m2 Final     Comment:     Effective 2021 eGFRcr in adults " is calculated using the 2021 CKD-EPI creatinine equation which includes age and gender (Sera et al., NE, DOI: 10.1056/ZANZjm3322605)     Calcium   Date Value Ref Range Status   06/28/2022 9.6 8.5 - 10.5 mg/dL Final     Lab Results   Component Value Date    WBC 6.4 06/28/2022      HGB 15.5 06/28/2022      MCV 95 06/28/2022       06/28/2022        IMP/PLAN:   (I10) Benign essential hypertension  Comment: good control   Plan: lisinopril 2.5 mg/day        (F33.9) Depression, recurrent (H)  Comment: by hx  Plan: sertraline 50 mg/day       (M54.9) Acute back pain, unspecified back location, unspecified back pain laterality  (M62.81) Generalized muscle weakness  Comment: pain improved per pt today    Plan: [facility] PHYSICAL THERAPY / OCCUPATIONAL THERAPY for transfers, gait, strengthening  Acetaminophen PRN     (F03.90) Moderate dementia without behavioral disturbance  Comment: cognitive and physical decline over past year   Plan: AL support for med admin, meals, activity; along with family and PCA assistance       (G47.09) Other insomnia  Comment: improved   Plan: melatonin 3 mg/HS       Bridgett Carreon MD

## 2022-09-14 ENCOUNTER — ASSISTED LIVING VISIT (OUTPATIENT)
Dept: GERIATRICS | Facility: CLINIC | Age: 87
End: 2022-09-14
Payer: COMMERCIAL

## 2022-09-14 VITALS
TEMPERATURE: 97.3 F | RESPIRATION RATE: 19 BRPM | HEART RATE: 90 BPM | DIASTOLIC BLOOD PRESSURE: 77 MMHG | BODY MASS INDEX: 30.22 KG/M2 | OXYGEN SATURATION: 96 % | WEIGHT: 177 LBS | HEIGHT: 64 IN | SYSTOLIC BLOOD PRESSURE: 129 MMHG

## 2022-09-14 DIAGNOSIS — K44.9 HIATAL HERNIA: ICD-10-CM

## 2022-09-14 DIAGNOSIS — Z71.89 ADVANCED DIRECTIVES, COUNSELING/DISCUSSION: ICD-10-CM

## 2022-09-14 DIAGNOSIS — R91.8 RIGHT LOWER LOBE PULMONARY INFILTRATE: ICD-10-CM

## 2022-09-14 DIAGNOSIS — F03.B0 MODERATE DEMENTIA WITHOUT BEHAVIORAL DISTURBANCE (H): Primary | ICD-10-CM

## 2022-09-14 DIAGNOSIS — K21.00 GASTROESOPHAGEAL REFLUX DISEASE WITH ESOPHAGITIS WITHOUT HEMORRHAGE: ICD-10-CM

## 2022-09-14 RX ORDER — ONDANSETRON 8 MG/1
8 TABLET, FILM COATED ORAL EVERY 8 HOURS PRN
Start: 2022-09-14

## 2022-09-14 RX ORDER — FAMOTIDINE 20 MG/1
20 TABLET, FILM COATED ORAL 2 TIMES DAILY
Start: 2022-09-14 | End: 2022-09-16

## 2022-09-14 NOTE — PROGRESS NOTES
"[facility] GERIATRICS    Chief Complaint   Patient presents with     RECHECK     HPI:  Yanelis David is a 89 year old  (1/22/1933), who is being seen today for an episodic care visit at: [facility]) [[id number]]. Today's concern is:    Patient recently evaluated in ED at [facility] 2/2 pain right back/rib. Found to have hiatal hernia and possible right lower lobe infiltrate. Was seen prior to this event by speech pathology and cleared. Treated with azithromycin x5 days. Remains asymptomatic today. Up with activity and denying chest pain, SOB or lightheadedness. Did report small about of acid reflux after breakfast this AM. ER doctor recommended long term therapy of acid reducer; however, no medications were started at discharge.     Progressive dementia, progressive and having more difficulty living alone. Transferred to [facility] yesterday. Reports she is doing ok for day #1. Alert and oriented x2 person and place. Short term memory loss noted.     Advanced directive conversation with patient as well as daughter Sophia. Wayne CABRAL was signed and scanned into [employer].     BP Readings from Last 3 Encounters:   08/31/22 129/77   08/30/22 129/77   08/31/22 129/77     Pulse Readings from Last 4 Encounters:   08/31/22 90   08/30/22 90   08/31/22 90   08/23/22 90     Wt Readings from Last 4 Encounters:   08/31/22 80.3 kg (177 lb)   08/30/22 80.4 kg (177 lb 3.2 oz)   08/31/22 80.4 kg (177 lb 3.2 oz)         Allergies, and PMH/PSH reviewed in EPIC today.  REVIEW OF SYSTEMS:  10 point ROS of systems including Constitutional, Eyes, Respiratory, Cardiovascular, Gastroenterology, Genitourinary, Integumentary, Musculoskeletal, Psychiatric were all negative except for pertinent positives noted in my HPI.    Objective:   /77   Pulse 90   Temp 97.3  F (36.3  C)   Resp 19   Ht 1.626 m (5' 4\")   Wt 80.3 kg (177 lb)   SpO2 96%   BMI 30.38 kg/m    GENERAL APPEARANCE:  Alert, " in no distress  ENT:  Mouth and posterior oropharynx normal, moist mucous membranes, normal hearing acuity  RESP:  respiratory effort and palpation of chest normal, lungs clear to auscultation   CV:  Palpation and auscultation of heart done , regular rate and rhythm, no murmur, rub, or gallop  ABDOMEN:  normal bowel sounds, soft, nontender, no hepatosplenomegaly or other masses  M/S:   Gait and station normal  Digits and nails normal  SKIN:  Inspection of skin and subcutaneous tissue baseline, Palpation of skin and subcutaneous tissue baseline  NEURO:   Cranial nerves 2-12 are normal tested and grossly at patient's baseline  PSYCH:  memory impaired , affect and mood normal    Labs done in SNF are in [facility]. Please refer to them using [employer]/Care Everywhere. and Recent labs in [facility] reviewed by me today.     Assessment/Plan:  (F03.90) Moderate dementia without behavioral disturbance (H)  (primary encounter diagnosis)  Comment: Chronic, progressive. New admission to AL memory care. Adjusting to new surrounding well. Without behavioral concerns at this time.   Plan:   -Continue [facility] support with medication administration, meals and safety.   -Expect further decline with progression of disease.     (K44.9) Hiatal hernia  (K21.00) Gastroesophageal reflux disease with esophagitis without hemorrhage  Comment: Incidentally found on chest x-ray. Has had acid reflux for majority of life. Has history of nausea after meals.   Plan:   -Zofran 8 mg PO q 8 hours PRN  -Famotidine 20 mg PO BID  -Speech therapy consult     (R91.8) Right lower lobe pulmonary infiltrate  Comment: Acute, being treated with z-isatu.  Plan:   -Continue antibiotic until completed. Remains asymptomatic at this time.           Post Medication Reconciliation Status: Discharge medications reconciled and changed, see notes/orders    TIME:  1. Chart review: time 22 minutes  2. Time with patient/exam: 15 minutes  3. Telephone call made to daughter and POA  Sophia regarding advanced directive- reviewed POLST. Start time: 12:55 End Time: 13:26  -Total Time: 68 minutes.     Reviewed co morbidities    Reviewed POLST     Exam     Chart review     Orders:  1. Famotidine 20 mg/bid  2. Speech therapy consult  3. zofran 8 mg PO q 8 hours PRN    Electronically signed by:   Erica Shaw NP

## 2022-09-14 NOTE — LETTER
9/14/2022        RE: Yanelis David  [address]  [place] [address]        [facility] GERIATRICS    Chief Complaint   Patient presents with     RECHECK     HPI:  Yanelis David is a 89 year old  (1/22/1933), who is being seen today for an episodic care visit at: [facility] ([facility]) [[id number]]. Today's concern is:    Patient recently evaluated in ED at [facility] 2/2 pain right back/rib. Found to have hiatal hernia and possible right lower lobe infiltrate. Was seen prior to this event by speech pathology and cleared. Treated with azithromycin x5 days. Remains asymptomatic today. Up with activity and denying chest pain, SOB or lightheadedness. Did report small about of acid reflux after breakfast this AM. ER doctor recommended long term therapy of acid reducer; however, no medications were started at discharge.     Progressive dementia, progressive and having more difficulty living alone. Transferred to [facility] yesterday. Reports she is doing ok for day #1. Alert and oriented x2 person and place. Short term memory loss noted.     Advanced directive conversation with patient as well as daughter Sophia. New POLST was signed and scanned into [employer].     BP Readings from Last 3 Encounters:   08/31/22 129/77   08/30/22 129/77   08/31/22 129/77     Pulse Readings from Last 4 Encounters:   08/31/22 90   08/30/22 90   08/31/22 90   08/23/22 90     Wt Readings from Last 4 Encounters:   08/31/22 80.3 kg (177 lb)   08/30/22 80.4 kg (177 lb 3.2 oz)   08/31/22 80.4 kg (177 lb 3.2 oz)         Allergies, and PMH/PSH reviewed in EPIC today.  REVIEW OF SYSTEMS:  10 point ROS of systems including Constitutional, Eyes, Respiratory, Cardiovascular, Gastroenterology, Genitourinary, Integumentary, Musculoskeletal, Psychiatric were all negative except for pertinent positives noted in my HPI.    Objective:   /77   Pulse 90   Temp 97.3  F (36.3  C)   Resp 19   Ht  "1.626 m (5' 4\")   Wt 80.3 kg (177 lb)   SpO2 96%   BMI 30.38 kg/m    GENERAL APPEARANCE:  Alert, in no distress  ENT:  Mouth and posterior oropharynx normal, moist mucous membranes, normal hearing acuity  RESP:  respiratory effort and palpation of chest normal, lungs clear to auscultation   CV:  Palpation and auscultation of heart done , regular rate and rhythm, no murmur, rub, or gallop  ABDOMEN:  normal bowel sounds, soft, nontender, no hepatosplenomegaly or other masses  M/S:   Gait and station normal  Digits and nails normal  SKIN:  Inspection of skin and subcutaneous tissue baseline, Palpation of skin and subcutaneous tissue baseline  NEURO:   Cranial nerves 2-12 are normal tested and grossly at patient's baseline  PSYCH:  memory impaired , affect and mood normal    Labs done in SNF are in [facility]. Please refer to them using [employer]/Care Everywhere. and Recent labs in [facility] reviewed by me today.     Assessment/Plan:  (F03.90) Moderate dementia without behavioral disturbance (H)  (primary encounter diagnosis)  Comment: Chronic, progressive. New admission to AL memory care. Adjusting to new surrounding well. Without behavioral concerns at this time.   Plan:   -Continue [facility] support with medication administration, meals and safety.   -Expect further decline with progression of disease.     (K44.9) Hiatal hernia  (K21.00) Gastroesophageal reflux disease with esophagitis without hemorrhage  Comment: Incidentally found on chest x-ray. Has had acid reflux for majority of life. Has history of nausea after meals.   Plan:   -Zofran 8 mg PO q 8 hours PRN  -Famotidine 20 mg PO BID  -Speech therapy consult     (R91.8) Right lower lobe pulmonary infiltrate  Comment: Acute, being treated with z-isatu.  Plan:   -Continue antibiotic until completed. Remains asymptomatic at this time.           Post Medication Reconciliation Status: Discharge medications reconciled and changed, see notes/orders    TIME:  1. Chart review: " time 22 minutes  2. Time with patient/exam: 15 minutes  3. Telephone call made to daughter and ASCENCION Cortes regarding advanced directive- reviewed POLST. Start time: 12:55 End Time: 13:26  -Total Time: 68 minutes.     Reviewed co morbidities    Reviewed POLST     Exam     Chart review     Orders:  1. Famotidine 20 mg/bid  2. Speech therapy consult  3. zofran 8 mg PO q 8 hours PRN    Electronically signed by:   Erica Shaw NP            Sincerely,        Erica Shaw NP

## 2022-09-15 DIAGNOSIS — M54.6 PAIN IN THORACIC SPINE: Primary | ICD-10-CM

## 2022-09-15 DIAGNOSIS — K21.00 GASTROESOPHAGEAL REFLUX DISEASE WITH ESOPHAGITIS WITHOUT HEMORRHAGE: ICD-10-CM

## 2022-09-15 DIAGNOSIS — K44.9 HIATAL HERNIA: ICD-10-CM

## 2022-09-16 RX ORDER — PSEUDOEPHED/ACETAMINOPH/DIPHEN 30MG-500MG
TABLET ORAL
Qty: 360 TABLET | Refills: 97 | Status: SHIPPED | OUTPATIENT
Start: 2022-09-16 | End: 2023-02-21

## 2022-09-16 RX ORDER — FAMOTIDINE 20 MG/1
TABLET, FILM COATED ORAL
Qty: 180 TABLET | Refills: 97 | Status: SHIPPED | OUTPATIENT
Start: 2022-09-16 | End: 2022-11-24

## 2022-10-12 ENCOUNTER — ASSISTED LIVING VISIT (OUTPATIENT)
Dept: GERIATRICS | Facility: CLINIC | Age: 87
End: 2022-10-12
Payer: COMMERCIAL

## 2022-10-12 VITALS
DIASTOLIC BLOOD PRESSURE: 94 MMHG | TEMPERATURE: 96.8 F | HEART RATE: 75 BPM | RESPIRATION RATE: 24 BRPM | HEIGHT: 64 IN | WEIGHT: 178.6 LBS | OXYGEN SATURATION: 95 % | BODY MASS INDEX: 30.49 KG/M2 | SYSTOLIC BLOOD PRESSURE: 152 MMHG

## 2022-10-12 DIAGNOSIS — I95.1 ORTHOSTATIC HYPOTENSION: Primary | ICD-10-CM

## 2022-10-12 NOTE — LETTER
"    10/12/2022        RE: Yanelis David  [address]  [place] [address]        [facility] GERIATRICS    Chief Complaint   Patient presents with     RECHECK     Ortho/BP'S     HPI:  Yanelis Mendoza is a 89 year old  (1/22/1933), who is being seen today for an episodic care visit at: [facility]) [[id number]]. Today's concern is: Patient with history of recent falls.  Staff he did complete orthostatic blood pressures, patient did have a decrease in systolic pressure with standing.  Patient does complain of dizziness, has a leaning gait while ambulating.    BP Readings from Last 3 Encounters:   10/12/22 (!) 152/94   08/31/22 129/77   08/30/22 129/77     Pulse Readings from Last 4 Encounters:   10/12/22 75   08/31/22 90   08/30/22 90   08/31/22 90     Wt Readings from Last 4 Encounters:   10/12/22 81 kg (178 lb 9.6 oz)   08/31/22 80.3 kg (177 lb)   08/30/22 80.4 kg (177 lb 3.2 oz)   08/31/22 80.4 kg (177 lb 3.2 oz)         Allergies, and PMH/PSH reviewed in [facility] today.  REVIEW OF SYSTEMS:  Unobtainable secondary to cognitive impairment.     Objective:   BP (!) 152/94   Pulse 75   Temp 96.8  F (36  C)   Resp 24   Ht 1.626 m (5' 4\")   Wt 81 kg (178 lb 9.6 oz)   SpO2 95%   BMI 30.66 kg/m    A & O x 1, NAD. Lungs CTA, non labored. RRR, S1/S2 w/o murmur,gallop or rub.  edema.  Abdomen soft, nontender, +BT'S. No focal neurological deficits.        Labs done in SNF are in [facility]. Please refer to them using [employer]/Care Everywhere. and Recent labs in [facility] reviewed by me today.     Assessment/Plan:  (I95.1) Orthostatic hypotension  (primary encounter diagnosis)  Comment: New onset type blood pressure changes with accompanying dizziness.  Frequent falls without injury  Plan:   GLENDA stockings on in a.m. off in p.m.  Recommend slow changes in positioning to reduce dizziness.  Monitor make adjustments as indicated.      Post Medication Reconciliation Status: Patient was not " discharged from an inpatient facility or TCU        Orders:  See new orders above    Electronically signed by:   Erica Shaw NP            Sincerely,        Erica Shaw, NP

## 2022-10-12 NOTE — PROGRESS NOTES
"[facility] GERIATRICS    Chief Complaint   Patient presents with     RECHECK     Ortho/BP'S     HPI:  Yanelis Mendoza is a 89 year old  (1/22/1933), who is being seen today for an episodic care visit at: [facility] ([facility]) [[id number]]. Today's concern is: Patient with history of recent falls.  Staff he did complete orthostatic blood pressures, patient did have a decrease in systolic pressure with standing.  Patient does complain of dizziness, has a leaning gait while ambulating.    BP Readings from Last 3 Encounters:   10/12/22 (!) 152/94   08/31/22 129/77   08/30/22 129/77     Pulse Readings from Last 4 Encounters:   10/12/22 75   08/31/22 90   08/30/22 90   08/31/22 90     Wt Readings from Last 4 Encounters:   10/12/22 81 kg (178 lb 9.6 oz)   08/31/22 80.3 kg (177 lb)   08/30/22 80.4 kg (177 lb 3.2 oz)   08/31/22 80.4 kg (177 lb 3.2 oz)         Allergies, and PMH/PSH reviewed in [employer] today.  REVIEW OF SYSTEMS:  Unobtainable secondary to cognitive impairment.     Objective:   BP (!) 152/94   Pulse 75   Temp 96.8  F (36  C)   Resp 24   Ht 1.626 m (5' 4\")   Wt 81 kg (178 lb 9.6 oz)   SpO2 95%   BMI 30.66 kg/m    A & O x 1, NAD. Lungs CTA, non labored. RRR, S1/S2 w/o murmur,gallop or rub.  edema.  Abdomen soft, nontender, +BT'S. No focal neurological deficits.        Labs done in SNF are in [facility]. Please refer to them using [employer]/Care Everywhere. and Recent labs in [facility] reviewed by me today.     Assessment/Plan:  (I95.1) Orthostatic hypotension  (primary encounter diagnosis)  Comment: New onset type blood pressure changes with accompanying dizziness.  Frequent falls without injury  Plan:   GLENDA stockings on in a.m. off in p.m.  Recommend slow changes in positioning to reduce dizziness.  Monitor make adjustments as indicated.      Post Medication Reconciliation Status: Patient was not discharged from an inpatient facility or TCU        Orders:  See new orders above    Electronically signed " by:   Erica Shaw, NP

## 2022-10-22 ENCOUNTER — HEALTH MAINTENANCE LETTER (OUTPATIENT)
Age: 87
End: 2022-10-22

## 2022-11-16 ENCOUNTER — ASSISTED LIVING VISIT (OUTPATIENT)
Dept: GERIATRICS | Facility: CLINIC | Age: 87
End: 2022-11-16
Payer: COMMERCIAL

## 2022-11-16 VITALS
WEIGHT: 181 LBS | HEART RATE: 48 BPM | HEIGHT: 64 IN | BODY MASS INDEX: 30.9 KG/M2 | SYSTOLIC BLOOD PRESSURE: 116 MMHG | OXYGEN SATURATION: 97 % | DIASTOLIC BLOOD PRESSURE: 81 MMHG | RESPIRATION RATE: 14 BRPM | TEMPERATURE: 98 F

## 2022-11-16 DIAGNOSIS — R11.2 NAUSEA AND VOMITING, UNSPECIFIED VOMITING TYPE: Primary | ICD-10-CM

## 2022-11-16 DIAGNOSIS — R41.3 MEMORY LOSS: ICD-10-CM

## 2022-11-16 DIAGNOSIS — R10.13 EPIGASTRIC ABDOMINAL PAIN: ICD-10-CM

## 2022-11-16 NOTE — LETTER
"    11/16/2022        RE: Yanelis David  [address]  [place] [address]        [facility] GERIATRICS    Chief Complaint   Patient presents with     RECHECK     Vomiting, chest pain     HPI:  Yanelis David is a 89 year old  (1/22/1933), who is being seen today for an episodic care visit at: [facility] ([facility]) [[id number]].     Today's concern is:   Patient with single episode of nausea with vomiting yesterday. Today, appears at baseline. Does have severe cognitive impairment therefore HPI difficult to obtain. Does has intermittent complaints of chest pain that spontaneously resolves. Denies CP today, SOB or lightheadedness. No further s/sx of acute illness.     Allergies, and PMH/PSH reviewed in EPIC today.  REVIEW OF SYSTEMS:  Unobtainable secondary to cognitive impairment.     Objective:   /81   Pulse (!) 48   Temp 98  F (36.7  C)   Resp 14   Ht 1.626 m (5' 4\")   Wt 82.1 kg (181 lb)   SpO2 97%   BMI 31.07 kg/m    A & O x 1, NAD. Lungs CTA, non labored. RRR, S1/S2 w/o murmur,gallop or rub.  edema.  Abdomen soft, nontender, +BT'S. No focal neurological deficits.        Labs done in SNF are in [facility]. Please refer to them using EPIC/Care Everywhere. and Recent labs in [facility] reviewed by me today.     Assessment/Plan:  (R11.2) Nausea and vomiting, unspecified vomiting type  (primary encounter diagnosis)  (R10.13) Epigastric abdominal pain  Comment: Single episode of nausea with vomiting, spontaneously resolved. 11/15/2022  Plan:   Likely 2/2 GERD given intermittent epigastric pain  -Omeprazole 20 mg/day  -Discontinue pepsid    (R41.3) Memory loss  Comment: Chronic, progressive. Resides in .  Plan:   -Continue AL memory care assist with medication administration, meals and safety. Expect further decline with progression of diease     MED REC REQUIRED  Post Medication Reconciliation Status: patient was not discharged from an inpatient facility or " TCU      Orders:  1. Omeprazole 20 mg/day  2. Discontinue pepsid     Electronically signed by:   Erica Shaw NP            Sincerely,        Erica Shaw, NP

## 2022-11-16 NOTE — PROGRESS NOTES
"[facility] GERIATRICS    Chief Complaint   Patient presents with     RECHECK     Vomiting, chest pain     HPI:  Yanelis David is a 89 year old  (1/22/1933), who is being seen today for an episodic care visit at: [facility] ([facility]) [[id number]].     Today's concern is:   Patient with single episode of nausea with vomiting yesterday. Today, appears at baseline. Does have severe cognitive impairment therefore HPI difficult to obtain. Does has intermittent complaints of chest pain that spontaneously resolves. Denies CP today, SOB or lightheadedness. No further s/sx of acute illness.     Allergies, and PMH/PSH reviewed in EPIC today.  REVIEW OF SYSTEMS:  Unobtainable secondary to cognitive impairment.     Objective:   /81   Pulse (!) 48   Temp 98  F (36.7  C)   Resp 14   Ht 1.626 m (5' 4\")   Wt 82.1 kg (181 lb)   SpO2 97%   BMI 31.07 kg/m    A & O x 1, NAD. Lungs CTA, non labored. RRR, S1/S2 w/o murmur,gallop or rub.  edema.  Abdomen soft, nontender, +BT'S. No focal neurological deficits.        Labs done in SNF are in [facility]. Please refer to them using [employer]/Care Everywhere. and Recent labs in [facility] reviewed by me today.     Assessment/Plan:  (R11.2) Nausea and vomiting, unspecified vomiting type  (primary encounter diagnosis)  (R10.13) Epigastric abdominal pain  Comment: Single episode of nausea with vomiting, spontaneously resolved. 11/15/2022  Plan:   Likely 2/2 GERD given intermittent epigastric pain  -Omeprazole 20 mg/day  -Discontinue pepsid    (R41.3) Memory loss  Comment: Chronic, progressive. Resides in .  Plan:   -Continue AL memory care assist with medication administration, meals and safety. Expect further decline with progression of diease     MED REC REQUIRED  Post Medication Reconciliation Status: patient was not discharged from an inpatient facility or TCU      Orders:  1. Omeprazole 20 mg/day  2. Discontinue pepsid     Electronically signed by:   Erica Shaw, " NP

## 2022-11-24 RX ORDER — OMEPRAZOLE 20 MG/1
20 TABLET, DELAYED RELEASE ORAL DAILY
Start: 2022-11-24

## 2022-11-30 ENCOUNTER — ASSISTED LIVING VISIT (OUTPATIENT)
Dept: GERIATRICS | Facility: CLINIC | Age: 87
End: 2022-11-30
Payer: COMMERCIAL

## 2022-11-30 VITALS
HEART RATE: 75 BPM | RESPIRATION RATE: 14 BRPM | DIASTOLIC BLOOD PRESSURE: 81 MMHG | SYSTOLIC BLOOD PRESSURE: 116 MMHG | HEIGHT: 64 IN | OXYGEN SATURATION: 97 % | BODY MASS INDEX: 30.9 KG/M2 | WEIGHT: 181 LBS | TEMPERATURE: 98 F

## 2022-11-30 DIAGNOSIS — I10 BENIGN ESSENTIAL HYPERTENSION: ICD-10-CM

## 2022-11-30 DIAGNOSIS — H93.91 LESION OF RIGHT EAR: Primary | ICD-10-CM

## 2022-11-30 DIAGNOSIS — R41.3 MEMORY PROBLEM: ICD-10-CM

## 2022-11-30 RX ORDER — LISINOPRIL 2.5 MG/1
5 TABLET ORAL DAILY
Qty: 90 TABLET | Refills: 97
Start: 2022-11-30 | End: 2023-04-29

## 2022-11-30 NOTE — PROGRESS NOTES
"[facility] GERIATRICS    Chief Complaint   Patient presents with     RECHECK     HPI:  Yanelis David is a 89 year old  (1/22/1933), who is being seen today for an episodic care visit at: [facility]) [[id number]]. Today's concern is: Patient participating in activity at time of visit. Pleasant but unable to provide thorough HPI given degree of cognitive impairment. Has new growth on right ear per family. No pain, drainage, noted. Left voicemail for daughter Sophia regarding goals of care. Additionally having hypertension with dizziness. Currently taking lisinopril 2.5 mg PO BID.     BP Readings from Last 3 Encounters:   11/30/22 116/81   11/16/22 116/81   10/12/22 (!) 152/94     Pulse Readings from Last 4 Encounters:   11/30/22 75   11/16/22 (!) 48   10/12/22 75   08/31/22 90     Wt Readings from Last 4 Encounters:   11/30/22 82.1 kg (181 lb)   11/16/22 82.1 kg (181 lb)   10/12/22 81 kg (178 lb 9.6 oz)   08/31/22 80.3 kg (177 lb)      Allergies, and PMH/PSH reviewed in [employer] today.  REVIEW OF SYSTEMS:  Unobtainable secondary to cognitive impairment.     Objective:   /81   Pulse 75   Temp 98  F (36.7  C)   Resp 14   Ht 1.626 m (5' 4\")   Wt 82.1 kg (181 lb)   SpO2 97%   BMI 31.07 kg/m    GENERAL APPEARANCE:  Alert, in no distress  ENT:  Mouth and posterior oropharynx normal, moist mucous membranes, [place]  RESP:  respiratory effort and palpation of chest normal, lungs clear to auscultation   CV:  Palpation and auscultation of heart done , regular rate and rhythm, no murmur, rub, or gallop  M/S:   Gait and station normal  Digits and nails normal  SKIN:  lesion right ear  NEURO:   Cranial nerves 2-12 are normal tested and grossly at patient's baseline  PSYCH:  memory impaired , affect and mood normal    Labs done in SNF are in [facility]. Please refer to them using EPIC/Care Everywhere. and Recent labs in [facility] reviewed by me today.     Assessment/Plan:  (H93.91) Lesion of right ear  " (primary encounter diagnosis)  Comment: New onset of lesion right ear. No drainage, bleeding, pain noted. Possibly seborrheic keratosis but difficult to determine given location.   Plan:   -VM left for POA, order given for dermatology consult.     (I10) Benign essential hypertension  Comment: Acute/chronic, BP range higher than goal.   Plan:   -Increase lisinopril 5 mg PO qday. Family updated via     (R41.3) Memory problem  Comment: Chronic, progressive. Resides in [facility]. No weight loss, falls or behaviors noted.   Plan:   -Continue  support with medication administration, meals and safety. Expect further decline with progression of disease.     MED REC REQUIRED  Post Medication Reconciliation Status: patient was not discharged from an inpatient facility or TCU      Orders:  1. Ok for dermatology consult.   2. Increase lisinopril 5 mg PO qday.     Electronically signed by:   Erica Shaw NP

## 2022-11-30 NOTE — LETTER
"    11/30/2022        RE: Yanelis David  [address]  [place] [address]        [facility] GERIATRICS    Chief Complaint   Patient presents with     RECHECK     HPI:  Yanelis David is a 89 year old  (1/22/1933), who is being seen today for an episodic care visit at: [facility] ([facility]) [[id number]]. Today's concern is: Patient participating in activity at time of visit. Pleasant but unable to provide thorough HPI given degree of cognitive impairment. Has new growth on right ear per family. No pain, drainage, noted. Left voicemail for daughter Sophia regarding goals of care. Additionally having hypertension with dizziness. Currently taking lisinopril 2.5 mg PO BID.     BP Readings from Last 3 Encounters:   11/30/22 116/81   11/16/22 116/81   10/12/22 (!) 152/94     Pulse Readings from Last 4 Encounters:   11/30/22 75   11/16/22 (!) 48   10/12/22 75   08/31/22 90     Wt Readings from Last 4 Encounters:   11/30/22 82.1 kg (181 lb)   11/16/22 82.1 kg (181 lb)   10/12/22 81 kg (178 lb 9.6 oz)   08/31/22 80.3 kg (177 lb)      Allergies, and PMH/PSH reviewed in EPIC today.  REVIEW OF SYSTEMS:  Unobtainable secondary to cognitive impairment.     Objective:   /81   Pulse 75   Temp 98  F (36.7  C)   Resp 14   Ht 1.626 m (5' 4\")   Wt 82.1 kg (181 lb)   SpO2 97%   BMI 31.07 kg/m    GENERAL APPEARANCE:  Alert, in no distress  ENT:  Mouth and posterior oropharynx normal, moist mucous membranes, [place]  RESP:  respiratory effort and palpation of chest normal, lungs clear to auscultation   CV:  Palpation and auscultation of heart done , regular rate and rhythm, no murmur, rub, or gallop  M/S:   Gait and station normal  Digits and nails normal  SKIN:  lesion right ear  NEURO:   Cranial nerves 2-12 are normal tested and grossly at patient's baseline  PSYCH:  memory impaired , affect and mood normal    Labs done in SNF are in [facility]. Please refer to them using EPIC/Care Everywhere. " and Recent labs in [facility] reviewed by me today.     Assessment/Plan:  (H93.91) Lesion of right ear  (primary encounter diagnosis)  Comment: New onset of lesion right ear. No drainage, bleeding, pain noted. Possibly seborrheic keratosis but difficult to determine given location.   Plan:   -VM left for POA, order given for dermatology consult.     (I10) Benign essential hypertension  Comment: Acute/chronic, BP range higher than goal.   Plan:   -Increase lisinopril 5 mg PO qday. Family updated via VM    (R41.3) Memory problem  Comment: Chronic, progressive. Resides in [facility]. No weight loss, falls or behaviors noted.   Plan:   -Continue  support with medication administration, meals and safety. Expect further decline with progression of disease.     MED REC REQUIRED  Post Medication Reconciliation Status: patient was not discharged from an inpatient facility or TCU      Orders:  1. Ok for dermatology consult.   2. Increase lisinopril 5 mg PO qday.     Electronically signed by:   Erica Shaw NP            Sincerely,        Erica Shaw NP

## 2022-12-08 ENCOUNTER — TELEPHONE (OUTPATIENT)
Dept: GERIATRICS | Facility: CLINIC | Age: 87
End: 2022-12-08

## 2022-12-12 DIAGNOSIS — K21.9 ESOPHAGEAL REFLUX: Primary | ICD-10-CM

## 2022-12-12 DIAGNOSIS — I10 BENIGN ESSENTIAL HYPERTENSION: ICD-10-CM

## 2022-12-13 RX ORDER — LISINOPRIL 5 MG/1
TABLET ORAL
Qty: 28 TABLET | Refills: 97 | Status: SHIPPED | OUTPATIENT
Start: 2022-12-13

## 2022-12-19 ENCOUNTER — LAB REQUISITION (OUTPATIENT)
Dept: LAB | Facility: CLINIC | Age: 87
End: 2022-12-19
Payer: COMMERCIAL

## 2022-12-19 DIAGNOSIS — J11.1 INFLUENZA DUE TO UNIDENTIFIED INFLUENZA VIRUS WITH OTHER RESPIRATORY MANIFESTATIONS: ICD-10-CM

## 2022-12-19 LAB
C PNEUM DNA SPEC QL NAA+PROBE: NOT DETECTED
FLUAV AG SPEC QL IA: NEGATIVE
FLUAV H1 2009 PAND RNA SPEC QL NAA+PROBE: NOT DETECTED
FLUAV H1 RNA SPEC QL NAA+PROBE: NOT DETECTED
FLUAV H3 RNA SPEC QL NAA+PROBE: NOT DETECTED
FLUAV RNA SPEC QL NAA+PROBE: NOT DETECTED
FLUBV AG SPEC QL IA: NEGATIVE
FLUBV RNA SPEC QL NAA+PROBE: NOT DETECTED
HADV DNA SPEC QL NAA+PROBE: NOT DETECTED
HCOV PNL SPEC NAA+PROBE: NOT DETECTED
HMPV RNA SPEC QL NAA+PROBE: NOT DETECTED
HPIV1 RNA SPEC QL NAA+PROBE: NOT DETECTED
HPIV2 RNA SPEC QL NAA+PROBE: NOT DETECTED
HPIV3 RNA SPEC QL NAA+PROBE: NOT DETECTED
HPIV4 RNA SPEC QL NAA+PROBE: NOT DETECTED
M PNEUMO DNA SPEC QL NAA+PROBE: NOT DETECTED
RSV RNA SPEC QL NAA+PROBE: NOT DETECTED
RSV RNA SPEC QL NAA+PROBE: NOT DETECTED
RV+EV RNA SPEC QL NAA+PROBE: NOT DETECTED

## 2022-12-19 PROCEDURE — 87486 CHLMYD PNEUM DNA AMP PROBE: CPT | Mod: ORL | Performed by: NURSE PRACTITIONER

## 2022-12-19 PROCEDURE — 87804 INFLUENZA ASSAY W/OPTIC: CPT | Mod: ORL | Performed by: NURSE PRACTITIONER

## 2023-01-01 ENCOUNTER — ASSISTED LIVING VISIT (OUTPATIENT)
Dept: GERIATRICS | Facility: CLINIC | Age: 88
End: 2023-01-01
Payer: COMMERCIAL

## 2023-01-01 ENCOUNTER — TELEPHONE (OUTPATIENT)
Dept: GERIATRICS | Facility: CLINIC | Age: 88
End: 2023-01-01
Payer: COMMERCIAL

## 2023-01-01 ENCOUNTER — DOCUMENTATION ONLY (OUTPATIENT)
Dept: GERIATRICS | Facility: CLINIC | Age: 88
End: 2023-01-01
Payer: COMMERCIAL

## 2023-01-01 VITALS
HEIGHT: 64 IN | RESPIRATION RATE: 18 BRPM | DIASTOLIC BLOOD PRESSURE: 60 MMHG | BODY MASS INDEX: 30.73 KG/M2 | HEART RATE: 54 BPM | TEMPERATURE: 96.2 F | WEIGHT: 180 LBS | SYSTOLIC BLOOD PRESSURE: 102 MMHG

## 2023-01-01 VITALS
SYSTOLIC BLOOD PRESSURE: 114 MMHG | TEMPERATURE: 97.1 F | WEIGHT: 196 LBS | HEIGHT: 64 IN | OXYGEN SATURATION: 97 % | DIASTOLIC BLOOD PRESSURE: 70 MMHG | HEART RATE: 82 BPM | RESPIRATION RATE: 18 BRPM | BODY MASS INDEX: 33.46 KG/M2

## 2023-01-01 DIAGNOSIS — M25.561 ACUTE PAIN OF RIGHT KNEE: Primary | ICD-10-CM

## 2023-01-01 DIAGNOSIS — K21.9 ESOPHAGEAL REFLUX: ICD-10-CM

## 2023-01-01 DIAGNOSIS — H61.23 BILATERAL IMPACTED CERUMEN: Primary | ICD-10-CM

## 2023-01-01 DIAGNOSIS — R11.0 NAUSEA: Primary | ICD-10-CM

## 2023-01-01 PROCEDURE — 99348 HOME/RES VST EST LOW MDM 30: CPT | Performed by: NURSE PRACTITIONER

## 2023-01-01 PROCEDURE — 99349 HOME/RES VST EST MOD MDM 40: CPT | Performed by: NURSE PRACTITIONER

## 2023-01-01 RX ORDER — ONDANSETRON 8 MG/1
TABLET, ORALLY DISINTEGRATING ORAL
Qty: 93 TABLET | Refills: 11 | Status: SHIPPED | OUTPATIENT
Start: 2023-01-01

## 2023-01-11 ENCOUNTER — ASSISTED LIVING VISIT (OUTPATIENT)
Dept: GERIATRICS | Facility: CLINIC | Age: 88
End: 2023-01-11
Payer: COMMERCIAL

## 2023-01-11 DIAGNOSIS — S06.5XAA SUBDURAL HEMATOMA (H): Primary | ICD-10-CM

## 2023-01-11 DIAGNOSIS — F33.9 DEPRESSION, RECURRENT (H): ICD-10-CM

## 2023-01-11 DIAGNOSIS — R53.81 PHYSICAL DECONDITIONING: ICD-10-CM

## 2023-01-11 DIAGNOSIS — J43.8 OTHER EMPHYSEMA (H): ICD-10-CM

## 2023-01-11 DIAGNOSIS — F02.B0 MODERATE LATE ONSET ALZHEIMER'S DEMENTIA WITHOUT BEHAVIORAL DISTURBANCE, PSYCHOTIC DISTURBANCE, MOOD DISTURBANCE, OR ANXIETY (H): ICD-10-CM

## 2023-01-11 DIAGNOSIS — G30.1 MODERATE LATE ONSET ALZHEIMER'S DEMENTIA WITHOUT BEHAVIORAL DISTURBANCE, PSYCHOTIC DISTURBANCE, MOOD DISTURBANCE, OR ANXIETY (H): ICD-10-CM

## 2023-01-11 PROCEDURE — 99350 HOME/RES VST EST HIGH MDM 60: CPT | Performed by: NURSE PRACTITIONER

## 2023-01-11 NOTE — LETTER
"    1/11/2023        RE: Yanelis David  [address]  [place] [address]        [facility] GERIATRICS    PRIMARY CARE PROVIDER AND CLINIC:  Erica Shaw NP, [address] [facility] [address]  Chief Complaint   Patient presents with     Hospital F/U      [place] Medical Record Number:  [id number]  Place of Service where encounter took place:  [facility] ([facility]) [[id number]]    Yanelis David  is a 89 year old  (1/22/1933),   HPI:  Patient recently hospitalized at [facility] following a fall with head strike. Patient sustained a subdural hematoma and facial laceration. There was no evidence of acute intracranial hemorrhage.     Today, she is sleeping in her room in wheelchair. Two of her daughters are present and report she has been very tired. Denies headaches today which family reports is the first day. Has been working with therapy.     BP Readings from Last 3 Encounters:   01/12/23 (!) 164/116   11/30/22 116/81   11/16/22 116/81     Pulse Readings from Last 4 Encounters:   01/12/23 84   11/30/22 75   11/16/22 (!) 48   10/12/22 75     Wt Readings from Last 4 Encounters:   01/12/23 83.1 kg (183 lb 3.2 oz)   11/30/22 82.1 kg (181 lb)   11/16/22 82.1 kg (181 lb)   10/12/22 81 kg (178 lb 9.6 oz)       CODE STATUS/ADVANCE DIRECTIVES DISCUSSION:  No CPR- Do NOT Intubate  DNR / DNI  ALLERGIES:   Allergies   Allergen Reactions     Codeine      Other reaction(s): GI Upset     Fludeoxyglucose      Wound Dressings      Adhesive Tape Rash     \"silk\"  tape     Contrast Dye Hives and Rash     Iodine Hives and Rash     Latex Rash     Other reaction(s): Intolerance-Can't Take  RED RASH, BANDAIDS OR TAPE OR GLOVES        PAST MEDICAL HISTORY:   Past Medical History:   Diagnosis Date     COPD (chronic obstructive pulmonary disease) (H) 9/4/2014     Depression, recurrent (H) 4/6/2021     DVT (deep venous thrombosis) (H) 8/9/2013    Formatting of this note might be different " from the original. 30's with bilateral lower extremity, unclear if provoked vs unprovoked. She states that she had erythema nodosum in her 30's. She could not remember if she has superficial thrombophlebitis vs deep vein thrombosis.     Esophageal reflux 12/31/2007    Formatting of this note might be different from the original. Dexilant before eating  On Prilosec.  Had upper endoscopy done showing polyp but no barretts     Generalized osteoarthritis 11/13/2008     Hyperlipidemia 12/31/2007    Formatting of this note might be different from the original. Diet controlled, on fish oil daily     Hypokalemia 5/11/2015     IBS (irritable bowel syndrome) 11/13/2008    Formatting of this note might be different from the original. Predominant diarrhea and constipation     Lumbago 4/29/2013     Malignant neoplasm of breast (H) 8/20/2014    Formatting of this note might be different from the original. right breast, s/p lumpectomy in 12/2013, on oral med.     Melanoma of skin (H) 4/20/2015     Memory problem 4/6/2021     SANTANA (obstructive sleep apnea) 8/5/2013    Formatting of this note might be different from the original. On CPAP     Other insomnia 9/10/2019     Pain in thoracic spine 4/29/2013      PAST SURGICAL HISTORY:   has no past surgical history on file.  FAMILY HISTORY: family history is not on file.  SOCIAL HISTORY:   reports that she has quit smoking. Her smoking use included cigarettes. She has never used smokeless tobacco. She reports current alcohol use of about 3.0 standard drinks per week. She reports that she does not use drugs.  Patient's living condition: lives in an assisted living facility    Post Discharge Medication Reconciliation Status:   MED REC REQUIRED  Post Medication Reconciliation Status: discharge medications reconciled and changed, per note/orders       Current Outpatient Medications   Medication Sig     ACETAMINOPHEN EXTRA STRENGTH 500 MG tablet TAKE TWO TABLETS (1000MG) BY MOUTH TWICE DAILY  "    CALCIUM + VITAMIN D3 600-10 MG-MCG TABS per tablet TAKE 1 TABLET BY MOUTH ONCE DAILY     lisinopril (ZESTRIL) 2.5 MG tablet Take 2 tablets (5 mg) by mouth daily     lisinopril (ZESTRIL) 5 MG tablet TAKE 1 TABLET BY MOUTH ONCE DAILY     melatonin 3 MG tablet TAKE 1 TABLET BY MOUTH AT BEDTIME     omeprazole (PRILOSEC OTC) 20 MG EC tablet Take 1 tablet (20 mg) by mouth daily     omeprazole (PRILOSEC) 20 MG DR capsule TAKE 1 CAPSULE BY MOUTH ONCE DAILY     ondansetron (ZOFRAN) 8 MG tablet Take 1 tablet (8 mg) by mouth every 8 hours as needed for nausea     sertraline (ZOLOFT) 50 MG tablet TAKE 1 TABLET BY MOUTH ONCE DAILY     No current facility-administered medications for this visit.       ROS:  10 point ROS of systems including Constitutional, Eyes, Respiratory, Cardiovascular, Gastroenterology, Genitourinary, Integumentary, Musculoskeletal, Psychiatric were all negative except for pertinent positives noted in my HPI.    Vitals:  BP (!) 164/116   Pulse 84   Temp 97.4  F (36.3  C)   Resp 27   Ht 1.626 m (5' 4\")   Wt 83.1 kg (183 lb 3.2 oz)   SpO2 94%   BMI 31.45 kg/m    Exam:  GENERAL APPEARANCE:  Alert, anxious  ENT:  Mouth and posterior oropharynx normal, moist mucous membranes  EYES:  EOM, conjunctivae, lids, pupils and irises normal  RESP:  respiratory effort and palpation of chest normal, lungs clear to auscultation   CV:  Palpation and auscultation of heart done , regular rate and rhythm, no murmur, rub, or gallop  ABDOMEN:  normal bowel sounds, soft, nontender, no hepatosplenomegaly or other masses  M/S:   Gait and station normal  Digits and nails normal  SKIN:  Inspection of skin and subcutaneous tissue baseline  NEURO:   Cranial nerves 2-12 are normal tested and grossly at patient's baseline  PSYCH:  memory impaired , affect and mood normal    Lab/Diagnostic data:  Labs done in SNF are in [facility]. Please refer to them using [employer] Everywhere. and Recent labs in [facility] reviewed by me today. "     ASSESSMENT/PLAN:    (S06.5XAA) Subdural hematoma  (primary encounter diagnosis)  Comment: Acute 2/2 fall. Improving headache. neuros intact.   Plan:   -Continue acetaminophen 1000 mg/BID  -Add an additional acetaminophen 1000 mg PO daily PRN  -Continue ondansetron PRN for nausea.     (G30.1,  F02.B0) Moderate late onset Alzheimer's dementia without behavioral disturbance, psychotic disturbance, mood disturbance, or anxiety (H)  Comment: Chronic, progressive. Resides in [facility]  Plan:   -Continue  support with medication administration, meals and safety. Expect further decline with progression of disease.     (J43.8) Other emphysema (H)  Comment: Chronic, respiratory status baseline   Plan:   -Continue with plan of care no changes at this time, adjustment as needed    (F33.9) Depression, recurrent (H)  Comment: Longstanding history.   Plan:   -sertraline 50 mg/day     (R53.81) Physical deconditioning  Comment: secondary to acute illness.   Plan:   -Continue therapies as ordered.         Orders:  1. Acetaminophen 1000 mg PO daily PRN    Total time spent with patient visit at the skilled nursing facility was 45 min including patient visit and review of past records. Greater than 50% of total time spent with counseling and coordinating care due to   -New diagnosis of subdural hematoma   -Acetaminophen dose adjustment   -Chart review and talking with POA and family present.      Electronically signed by:  Erica Shaw NP                    Sincerely,        Erica Shaw NP

## 2023-01-12 VITALS
HEIGHT: 64 IN | OXYGEN SATURATION: 94 % | BODY MASS INDEX: 31.28 KG/M2 | RESPIRATION RATE: 27 BRPM | DIASTOLIC BLOOD PRESSURE: 116 MMHG | TEMPERATURE: 97.4 F | WEIGHT: 183.2 LBS | HEART RATE: 84 BPM | SYSTOLIC BLOOD PRESSURE: 164 MMHG

## 2023-01-12 PROBLEM — S06.5XAA ACUTE SUBDURAL HEMATOMA (H): Status: ACTIVE | Noted: 2023-01-04

## 2023-01-12 PROBLEM — I10 ESSENTIAL HYPERTENSION: Status: ACTIVE | Noted: 2019-03-12
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
## 2023-01-12 PROBLEM — R53.1 GENERAL WEAKNESS: Status: ACTIVE | Noted: 2022-09-08

## 2023-01-12 PROBLEM — R62.7 ADULT FAILURE TO THRIVE: Status: ACTIVE | Noted: 2022-09-08

## 2023-01-12 PROBLEM — R05.9 COUGH: Status: ACTIVE | Noted: 2022-09-08

## 2023-01-12 PROBLEM — S01.91XA LACERATION OF HEAD: Status: ACTIVE | Noted: 2023-01-04

## 2023-01-12 NOTE — PROGRESS NOTES
"[facility] GERIATRICS    PRIMARY CARE PROVIDER AND CLINIC:  Erica Shaw NP, [address] [facility] [address]  Chief Complaint   Patient presents with     Hospital F/U      [place] Medical Record Number:  [id number]  Place of Service where encounter took place:  [facility] ([facility]) [[id number]]    Yanelis David  is a 89 year old  (1/22/1933),   HPI:  Patient recently hospitalized at [facility] following a fall with head strike. Patient sustained a subdural hematoma and facial laceration. There was no evidence of acute intracranial hemorrhage.     Today, she is sleeping in her room in wheelchair. Two of her daughters are present and report she has been very tired. Denies headaches today which family reports is the first day. Has been working with therapy.     BP Readings from Last 3 Encounters:   01/12/23 (!) 164/116   11/30/22 116/81   11/16/22 116/81     Pulse Readings from Last 4 Encounters:   01/12/23 84   11/30/22 75   11/16/22 (!) 48   10/12/22 75     Wt Readings from Last 4 Encounters:   01/12/23 83.1 kg (183 lb 3.2 oz)   11/30/22 82.1 kg (181 lb)   11/16/22 82.1 kg (181 lb)   10/12/22 81 kg (178 lb 9.6 oz)       CODE STATUS/ADVANCE DIRECTIVES DISCUSSION:  No CPR- Do NOT Intubate  DNR / DNI  ALLERGIES:   Allergies   Allergen Reactions     Codeine      Other reaction(s): GI Upset     Fludeoxyglucose      Wound Dressings      Adhesive Tape Rash     \"silk\"  tape     Contrast Dye Hives and Rash     Iodine Hives and Rash     Latex Rash     Other reaction(s): Intolerance-Can't Take  RED RASH, BANDAIDS OR TAPE OR GLOVES        PAST MEDICAL HISTORY:   Past Medical History:   Diagnosis Date     COPD (chronic obstructive pulmonary disease) (H) 9/4/2014     Depression, recurrent (H) 4/6/2021     DVT (deep venous thrombosis) (H) 8/9/2013    Formatting of this note might be different from the original. 30's with bilateral lower extremity, unclear if provoked vs unprovoked. She states " that she had erythema nodosum in her 30's. She could not remember if she has superficial thrombophlebitis vs deep vein thrombosis.     Esophageal reflux 12/31/2007    Formatting of this note might be different from the original. Dexilant before eating  On Prilosec.  Had upper endoscopy done showing polyp but no barretts     Generalized osteoarthritis 11/13/2008     Hyperlipidemia 12/31/2007    Formatting of this note might be different from the original. Diet controlled, on fish oil daily     Hypokalemia 5/11/2015     IBS (irritable bowel syndrome) 11/13/2008    Formatting of this note might be different from the original. Predominant diarrhea and constipation     Lumbago 4/29/2013     Malignant neoplasm of breast (H) 8/20/2014    Formatting of this note might be different from the original. right breast, s/p lumpectomy in 12/2013, on oral med.     Melanoma of skin (H) 4/20/2015     Memory problem 4/6/2021     SANTANA (obstructive sleep apnea) 8/5/2013    Formatting of this note might be different from the original. On CPAP     Other insomnia 9/10/2019     Pain in thoracic spine 4/29/2013      PAST SURGICAL HISTORY:   has no past surgical history on file.  FAMILY HISTORY: family history is not on file.  SOCIAL HISTORY:   reports that she has quit smoking. Her smoking use included cigarettes. She has never used smokeless tobacco. She reports current alcohol use of about 3.0 standard drinks per week. She reports that she does not use drugs.  Patient's living condition: lives in an assisted living facility    Post Discharge Medication Reconciliation Status:   MED REC REQUIRED  Post Medication Reconciliation Status: discharge medications reconciled and changed, per note/orders       Current Outpatient Medications   Medication Sig     ACETAMINOPHEN EXTRA STRENGTH 500 MG tablet TAKE TWO TABLETS (1000MG) BY MOUTH TWICE DAILY     CALCIUM + VITAMIN D3 600-10 MG-MCG TABS per tablet TAKE 1 TABLET BY MOUTH ONCE DAILY     lisinopril  "(ZESTRIL) 2.5 MG tablet Take 2 tablets (5 mg) by mouth daily     lisinopril (ZESTRIL) 5 MG tablet TAKE 1 TABLET BY MOUTH ONCE DAILY     melatonin 3 MG tablet TAKE 1 TABLET BY MOUTH AT BEDTIME     omeprazole (PRILOSEC OTC) 20 MG EC tablet Take 1 tablet (20 mg) by mouth daily     omeprazole (PRILOSEC) 20 MG DR capsule TAKE 1 CAPSULE BY MOUTH ONCE DAILY     ondansetron (ZOFRAN) 8 MG tablet Take 1 tablet (8 mg) by mouth every 8 hours as needed for nausea     sertraline (ZOLOFT) 50 MG tablet TAKE 1 TABLET BY MOUTH ONCE DAILY     No current facility-administered medications for this visit.       ROS:  10 point ROS of systems including Constitutional, Eyes, Respiratory, Cardiovascular, Gastroenterology, Genitourinary, Integumentary, Musculoskeletal, Psychiatric were all negative except for pertinent positives noted in my HPI.    Vitals:  BP (!) 164/116   Pulse 84   Temp 97.4  F (36.3  C)   Resp 27   Ht 1.626 m (5' 4\")   Wt 83.1 kg (183 lb 3.2 oz)   SpO2 94%   BMI 31.45 kg/m    Exam:  GENERAL APPEARANCE:  Alert, anxious  ENT:  Mouth and posterior oropharynx normal, moist mucous membranes  EYES:  EOM, conjunctivae, lids, pupils and irises normal  RESP:  respiratory effort and palpation of chest normal, lungs clear to auscultation   CV:  Palpation and auscultation of heart done , regular rate and rhythm, no murmur, rub, or gallop  ABDOMEN:  normal bowel sounds, soft, nontender, no hepatosplenomegaly or other masses  M/S:   Gait and station normal  Digits and nails normal  SKIN:  Inspection of skin and subcutaneous tissue baseline  NEURO:   Cranial nerves 2-12 are normal tested and grossly at patient's baseline  PSYCH:  memory impaired , affect and mood normal    Lab/Diagnostic data:  Labs done in SNF are in [facility]. Please refer to them using [employer] Everywhere. and Recent labs in [facility] reviewed by me today.     ASSESSMENT/PLAN:    (S06.5XAA) Subdural hematoma  (primary encounter diagnosis)  Comment: Acute 2/2 " fall. Improving headache. neuros intact.   Plan:   -Continue acetaminophen 1000 mg/BID  -Add an additional acetaminophen 1000 mg PO daily PRN  -Continue ondansetron PRN for nausea.     (G30.1,  F02.B0) Moderate late onset Alzheimer's dementia without behavioral disturbance, psychotic disturbance, mood disturbance, or anxiety (H)  Comment: Chronic, progressive. Resides in [facility]  Plan:   -Continue  support with medication administration, meals and safety. Expect further decline with progression of disease.     (J43.8) Other emphysema (H)  Comment: Chronic, respiratory status baseline   Plan:   -Continue with plan of care no changes at this time, adjustment as needed    (F33.9) Depression, recurrent (H)  Comment: Longstanding history.   Plan:   -sertraline 50 mg/day     (R53.81) Physical deconditioning  Comment: secondary to acute illness.   Plan:   -Continue therapies as ordered.         Orders:  1. Acetaminophen 1000 mg PO daily PRN    Total time spent with patient visit at the skilled nursing facility was 45 min including patient visit and review of past records. Greater than 50% of total time spent with counseling and coordinating care due to   -New diagnosis of subdural hematoma   -Acetaminophen dose adjustment   -Chart review and talking with POA and family present.      Electronically signed by:  Erica Shaw NP

## 2023-01-12 NOTE — PROGRESS NOTES
"[facility] GERIATRICS    Chief Complaint   Patient presents with     RECHECK     HPI:  Yanelis David is a 89 year old  (1/22/1933), who is being seen today for an episodic care visit at: [facility] ([facility]) [[id number]]. Today's concern is: ***    Allergies, and PMH/PSH reviewed in [facility] today.  REVIEW OF SYSTEMS:  {xwcaes77:662421}    Objective:   BP (!) 164/116   Pulse 84   Temp 97.4  F (36.3  C)   Resp 27   Ht 1.626 m (5' 4\")   Wt 83.1 kg (183 lb 3.2 oz)   SpO2 94%   BMI 31.45 kg/m    {Nursing home physical exam :974110}    {fgslab:722418}    Assessment/Plan:  {S DX2:595368}    MED REC REQUIRED{TIP  Click the link below to document or use med rec list, use list to pull in response :962915}  Post Medication Reconciliation Status: {MED REC LIST:833531}      Orders:  {fgsorders:303903}  ***    Electronically signed by: Franny Brink MA ***    "

## 2023-02-02 PROBLEM — G30.1 MODERATE LATE ONSET ALZHEIMER'S DEMENTIA WITHOUT BEHAVIORAL DISTURBANCE, PSYCHOTIC DISTURBANCE, MOOD DISTURBANCE, OR ANXIETY (H): Status: ACTIVE | Noted: 2023-02-02

## 2023-02-02 PROBLEM — F02.B0 MODERATE LATE ONSET ALZHEIMER'S DEMENTIA WITHOUT BEHAVIORAL DISTURBANCE, PSYCHOTIC DISTURBANCE, MOOD DISTURBANCE, OR ANXIETY (H): Status: ACTIVE | Noted: 2023-02-02

## 2023-02-02 RX ORDER — ACETAMINOPHEN 500 MG
1000 TABLET ORAL DAILY PRN
Start: 2023-02-02

## 2023-02-15 DIAGNOSIS — Z53.9 DIAGNOSIS NOT YET DEFINED: Primary | ICD-10-CM

## 2023-02-15 PROCEDURE — G0180 MD CERTIFICATION HHA PATIENT: HCPCS | Performed by: NURSE PRACTITIONER

## 2023-02-22 ENCOUNTER — ASSISTED LIVING VISIT (OUTPATIENT)
Dept: GERIATRICS | Facility: CLINIC | Age: 88
End: 2023-02-22
Payer: COMMERCIAL

## 2023-02-22 VITALS
RESPIRATION RATE: 18 BRPM | HEART RATE: 79 BPM | BODY MASS INDEX: 31.24 KG/M2 | OXYGEN SATURATION: 96 % | SYSTOLIC BLOOD PRESSURE: 154 MMHG | TEMPERATURE: 97 F | WEIGHT: 183 LBS | DIASTOLIC BLOOD PRESSURE: 88 MMHG | HEIGHT: 64 IN

## 2023-02-22 DIAGNOSIS — R00.1 BRADYCARDIA: Primary | ICD-10-CM

## 2023-02-22 PROCEDURE — 99348 HOME/RES VST EST LOW MDM 30: CPT | Performed by: NURSE PRACTITIONER

## 2023-02-22 NOTE — PROGRESS NOTES
"[facility] GERIATRICS    Chief Complaint   Patient presents with     RECHECK     Routine/Therapy started/heart rate     HPI:  Yanelis David is a 89 year old  (1/22/1933), who is being seen today for an episodic care visit at: [facility] ([facility]) [[id number]]. Today's concern is: Per physical therapy, patient had low heart rate of 44.  At that time did report she was feeling slightly lightheaded but recovered with rest.  Per chart review patient has had no other dips in heart rate noted.  Denies dizziness, lightheadedness, chest pain.    Pulse Readings from Last 4 Encounters:   02/22/23 79   01/12/23 84   11/30/22 75   11/16/22 (!) 48         Allergies, and PMH/PSH reviewed in [facility] today.  REVIEW OF SYSTEMS:  Limited secondary to cognitive impairment but today pt reports feeling fine    Objective:   BP (!) 154/88   Pulse 79   Temp 97  F (36.1  C)   Resp 18   Ht 1.626 m (5' 4\")   Wt 83 kg (183 lb)   SpO2 96%   BMI 31.41 kg/m    A & O x 3, NAD. Lungs CTA, non labored. RRR, S1/S2 w/o murmur,gallop or rub.  edema.  Abdomen soft, nontender, +BT'S. No focal neurological deficits.      Recent labs in [facility] reviewed by me today.     Assessment/Plan:  (R00.1) Bradycardia  (primary encounter diagnosis)  Comment: Acute, isolated event.  No further episodes per chart review  Plan:   -Monitor and update NP as indicated.  Monitor for signs and symptoms of bradycardia.        MED REC REQUIRED  Post Medication Reconciliation Status: patient was not discharged from an inpatient facility or TCU      Orders:  NNO    Electronically signed by:   Erica Shaw NP      "
Seth, echo tech

## 2023-02-22 NOTE — LETTER
"    2/22/2023        RE: Yanelis David  [address]  [place] [address]        [facility] GERIATRICS    Chief Complaint   Patient presents with     RECHECK     Routine/Therapy started/heart rate     HPI:  Yanelis David is a 89 year old  (1/22/1933), who is being seen today for an episodic care visit at: [facility]) [[id number]]. Today's concern is: Per physical therapy, patient had low heart rate of 44.  At that time did report she was feeling slightly lightheaded but recovered with rest.  Per chart review patient has had no other dips in heart rate noted.  Denies dizziness, lightheadedness, chest pain.    Pulse Readings from Last 4 Encounters:   02/22/23 79   01/12/23 84   11/30/22 75   11/16/22 (!) 48         Allergies, and PMH/PSH reviewed in [facility] today.  REVIEW OF SYSTEMS:  Limited secondary to cognitive impairment but today pt reports feeling fine    Objective:   BP (!) 154/88   Pulse 79   Temp 97  F (36.1  C)   Resp 18   Ht 1.626 m (5' 4\")   Wt 83 kg (183 lb)   SpO2 96%   BMI 31.41 kg/m    A & O x 3, NAD. Lungs CTA, non labored. RRR, S1/S2 w/o murmur,gallop or rub.  edema.  Abdomen soft, nontender, +BT'S. No focal neurological deficits.      Recent labs in [facility] reviewed by me today.     Assessment/Plan:  (R00.1) Bradycardia  (primary encounter diagnosis)  Comment: Acute, isolated event.  No further episodes per chart review  Plan:   -Monitor and update NP as indicated.  Monitor for signs and symptoms of bradycardia.        MED REC REQUIRED  Post Medication Reconciliation Status: patient was not discharged from an inpatient facility or TCU      Orders:  NNO    Electronically signed by:   Erica Shaw NP            Sincerely,        Erica Shaw NP    "

## 2023-02-24 ENCOUNTER — TELEPHONE (OUTPATIENT)
Dept: GERIATRICS | Facility: CLINIC | Age: 88
End: 2023-02-24
Payer: COMMERCIAL

## 2023-02-24 NOTE — TELEPHONE ENCOUNTER
"[facility] GERIATRIC SERVICES NON-EMERGENT ENCOUNTER    Yanelis David is a 89 year old  (1/22/1933), phone call received today regarding:     Disposition    Pt tested positive for COVID and has the following symptoms: lack of appetite, 1 emesis, and stating, \"I just don't feel good\". Pt is in memory care. Vitals: /87, T 97.6, O2 95% RA, P 87. Recent GFR in January: 56. Family has been notified and is deciding whether they want antiviral treatment. PCP updated.    Verbal Order/Direction given by Provider:   Start Paxlovid - dosing per pharmacy recommendation  Have pharmacy do drug to drug interactions - NP okay with recommendations of pharmacy.    Provider giving Order:  SRIDHAR Torres CNP    Verbal Order given to: Ewa    Electronically signed by:   Allison Wright RN  "

## 2023-03-07 NOTE — PROGRESS NOTES
[facility] GERIATRICS    Chief Complaint   Patient presents with     RECHECK     Swelling r ear/neck     HPI:  Yanelis David is a 89 year old  (1/22/1933), who is being seen today for an episodic care visit at: [facility] ([facility]) [[id number]]. Today's concern is:   Past Medical History:   Diagnosis Date     COPD (chronic obstructive pulmonary disease) (H) 9/4/2014     Depression, recurrent (H) 4/6/2021     DVT (deep venous thrombosis) (H) 8/9/2013    Formatting of this note might be different from the original. 30's with bilateral lower extremity, unclear if provoked vs unprovoked. She states that she had erythema nodosum in her 30's. She could not remember if she has superficial thrombophlebitis vs deep vein thrombosis.     Esophageal reflux 12/31/2007    Formatting of this note might be different from the original. Dexilant before eating  On Prilosec.  Had upper endoscopy done showing polyp but no barretts     Generalized osteoarthritis 11/13/2008     Hyperlipidemia 12/31/2007    Formatting of this note might be different from the original. Diet controlled, on fish oil daily     Hypokalemia 5/11/2015     IBS (irritable bowel syndrome) 11/13/2008    Formatting of this note might be different from the original. Predominant diarrhea and constipation     Lumbago 4/29/2013     Malignant neoplasm of breast (H) 8/20/2014    Formatting of this note might be different from the original. right breast, s/p lumpectomy in 12/2013, on oral med.     Melanoma of skin (H) 4/20/2015     Memory problem 4/6/2021     SANTANA (obstructive sleep apnea) 8/5/2013    Formatting of this note might be different from the original. On CPAP     Other insomnia 9/10/2019     Pain in thoracic spine 4/29/2013     Patient is resting in dining room. Unable to provide thorough HPI 2/2 baseline impaired cognition. Per staff, has some slight swelling of right ear and down neck, improving. Patient complaining of mild outer ear pain. Reports  "not feeling well and HR in the 30's, wants to rest her head on the table. Per chart review, patient has fluctuating HR.     BP Readings from Last 3 Encounters:   03/08/23 127/87   02/22/23 (!) 154/88   01/12/23 (!) 164/116     Pulse Readings from Last 4 Encounters:   03/08/23 85   02/22/23 79   01/12/23 84   11/30/22 75       Allergies, and PMH/PSH reviewed in [facility] today.  REVIEW OF SYSTEMS:  Unobtainable secondary to cognitive impairment.     Objective:   /87   Pulse 85   Temp 97.6  F (36.4  C)   Resp 18   Ht 1.626 m (5' 4\")   Wt 83.1 kg (183 lb 3.2 oz)   SpO2 95%   BMI 31.45 kg/m    A & O x 1, NAD. Lungs CTA, non labored. RRR, S1/S2 w/o murmur,gallop or rub.  edema.  Abdomen soft, nontender, +BT'S. No focal neurological deficits. Minimal swelling right ear and face.     Recent labs in [facility] reviewed by me today.     Assessment/Plan:  Facial swelling  Pain  Acute onset of facial swelling and ear pain. Patient out of Covid-19 isolation yesterday. Occipital and superficial cervical Lymphadentitis. No erythema noted, cannot r/o mastoiditis. Mild internal carlo pain.  -Start keflex 500 mg PO BID- cover both soft skin infection and otitis externa.      Bradycardia  -Intermittent bradycardia, 2nd episode in 4 weeks. Accompanied symptoms of fatigue and generalized malaise. No offending pharmacological agents. Stable cardiac status.   -EKG r/o bradycardia vs. Possible atrial fibrillation given fluctuating HR.     MED REC REQUIRED  Post Medication Reconciliation Status: patient was not discharged from an inpatient facility or TCU      Orders:  1. Keflex 500 mg PO BID x7 days   2. EKG- bradycardia       Electronically signed by:   Erica Shaw NP      "

## 2023-03-08 ENCOUNTER — ASSISTED LIVING VISIT (OUTPATIENT)
Dept: GERIATRICS | Facility: CLINIC | Age: 88
End: 2023-03-08
Payer: COMMERCIAL

## 2023-03-08 VITALS
TEMPERATURE: 97.6 F | SYSTOLIC BLOOD PRESSURE: 127 MMHG | DIASTOLIC BLOOD PRESSURE: 87 MMHG | OXYGEN SATURATION: 95 % | HEIGHT: 64 IN | RESPIRATION RATE: 18 BRPM | HEART RATE: 85 BPM | WEIGHT: 183.2 LBS | BODY MASS INDEX: 31.28 KG/M2

## 2023-03-08 DIAGNOSIS — R52 PAIN: ICD-10-CM

## 2023-03-08 DIAGNOSIS — R00.1 BRADYCARDIA: ICD-10-CM

## 2023-03-08 DIAGNOSIS — R22.0 FACIAL SWELLING: Primary | ICD-10-CM

## 2023-03-08 PROCEDURE — 99350 HOME/RES VST EST HIGH MDM 60: CPT | Performed by: NURSE PRACTITIONER

## 2023-03-08 NOTE — LETTER
3/8/2023        RE: Yanelis David  [address]  [place] [address]        [facility] GERIATRICS    Chief Complaint   Patient presents with     RECHECK     Swelling r ear/neck     HPI:  Yanelis David is a 89 year old  (1/22/1933), who is being seen today for an episodic care visit at: [facility] ([facility]) [[id number]]. Today's concern is:   Past Medical History:   Diagnosis Date     COPD (chronic obstructive pulmonary disease) (H) 9/4/2014     Depression, recurrent (H) 4/6/2021     DVT (deep venous thrombosis) (H) 8/9/2013    Formatting of this note might be different from the original. 30's with bilateral lower extremity, unclear if provoked vs unprovoked. She states that she had erythema nodosum in her 30's. She could not remember if she has superficial thrombophlebitis vs deep vein thrombosis.     Esophageal reflux 12/31/2007    Formatting of this note might be different from the original. Dexilant before eating  On Prilosec.  Had upper endoscopy done showing polyp but no barretts     Generalized osteoarthritis 11/13/2008     Hyperlipidemia 12/31/2007    Formatting of this note might be different from the original. Diet controlled, on fish oil daily     Hypokalemia 5/11/2015     IBS (irritable bowel syndrome) 11/13/2008    Formatting of this note might be different from the original. Predominant diarrhea and constipation     Lumbago 4/29/2013     Malignant neoplasm of breast (H) 8/20/2014    Formatting of this note might be different from the original. right breast, s/p lumpectomy in 12/2013, on oral med.     Melanoma of skin (H) 4/20/2015     Memory problem 4/6/2021     SANTANA (obstructive sleep apnea) 8/5/2013    Formatting of this note might be different from the original. On CPAP     Other insomnia 9/10/2019     Pain in thoracic spine 4/29/2013     Patient is resting in dining room. Unable to provide thorough HPI 2/2 baseline impaired cognition. Per staff, has some slight  "swelling of right ear and down neck, improving. Patient complaining of mild outer ear pain. Reports not feeling well and HR in the 30's, wants to rest her head on the table. Per chart review, patient has fluctuating HR.     BP Readings from Last 3 Encounters:   03/08/23 127/87   02/22/23 (!) 154/88   01/12/23 (!) 164/116     Pulse Readings from Last 4 Encounters:   03/08/23 85   02/22/23 79   01/12/23 84   11/30/22 75       Allergies, and PMH/PSH reviewed in [facility] today.  REVIEW OF SYSTEMS:  Unobtainable secondary to cognitive impairment.     Objective:   /87   Pulse 85   Temp 97.6  F (36.4  C)   Resp 18   Ht 1.626 m (5' 4\")   Wt 83.1 kg (183 lb 3.2 oz)   SpO2 95%   BMI 31.45 kg/m    A & O x 1, NAD. Lungs CTA, non labored. RRR, S1/S2 w/o murmur,gallop or rub.  edema.  Abdomen soft, nontender, +BT'S. No focal neurological deficits. Minimal swelling right ear and face.     Recent labs in [facility] reviewed by me today.     Assessment/Plan:  Facial swelling  Pain  Acute onset of facial swelling and ear pain. Patient out of Covid-19 isolation yesterday. Occipital and superficial cervical Lymphadentitis. No erythema noted, cannot r/o mastoiditis. Mild internal carlo pain.  -Start keflex 500 mg PO BID- cover both soft skin infection and otitis externa.      Bradycardia  -Intermittent bradycardia, 2nd episode in 4 weeks. Accompanied symptoms of fatigue and generalized malaise. No offending pharmacological agents. Stable cardiac status.   -EKG r/o bradycardia vs. Possible atrial fibrillation given fluctuating HR.     MED REC REQUIRED  Post Medication Reconciliation Status: patient was not discharged from an inpatient facility or TCU      Orders:  1. Keflex 500 mg PO BID x7 days   2. EKG- bradycardia       Electronically signed by:   Erica Shaw NP            Sincerely,        Erica Shaw NP    "

## 2023-03-09 RX ORDER — CEPHALEXIN 500 MG/1
500 CAPSULE ORAL 2 TIMES DAILY
Qty: 14 CAPSULE | Refills: 0
Start: 2023-03-09 | End: 2023-03-16

## 2023-03-12 ENCOUNTER — LAB REQUISITION (OUTPATIENT)
Dept: LAB | Facility: CLINIC | Age: 88
End: 2023-03-12
Payer: COMMERCIAL

## 2023-03-12 DIAGNOSIS — E87.6 HYPOKALEMIA: ICD-10-CM

## 2023-03-14 LAB
ANION GAP SERPL CALCULATED.3IONS-SCNC: 17 MMOL/L (ref 7–15)
BUN SERPL-MCNC: 18.8 MG/DL (ref 8–23)
CALCIUM SERPL-MCNC: 9.6 MG/DL (ref 8.2–9.6)
CHLORIDE SERPL-SCNC: 101 MMOL/L (ref 98–107)
CREAT SERPL-MCNC: 0.95 MG/DL (ref 0.51–0.95)
DEPRECATED HCO3 PLAS-SCNC: 22 MMOL/L (ref 22–29)
GFR SERPL CREATININE-BSD FRML MDRD: 57 ML/MIN/1.73M2
GLUCOSE SERPL-MCNC: 82 MG/DL (ref 70–99)
POTASSIUM SERPL-SCNC: 4.2 MMOL/L (ref 3.4–5.3)
SODIUM SERPL-SCNC: 140 MMOL/L (ref 136–145)

## 2023-03-14 PROCEDURE — 36415 COLL VENOUS BLD VENIPUNCTURE: CPT | Mod: ORL | Performed by: NURSE PRACTITIONER

## 2023-03-14 PROCEDURE — 80048 BASIC METABOLIC PNL TOTAL CA: CPT | Mod: ORL | Performed by: NURSE PRACTITIONER

## 2023-03-14 PROCEDURE — P9603 ONE-WAY ALLOW PRORATED MILES: HCPCS | Mod: ORL | Performed by: NURSE PRACTITIONER

## 2023-03-15 ENCOUNTER — TELEPHONE (OUTPATIENT)
Dept: GERIATRICS | Facility: CLINIC | Age: 88
End: 2023-03-15
Payer: COMMERCIAL

## 2023-03-15 NOTE — TELEPHONE ENCOUNTER
"[facility] Triage Nurse Telephone Encounter    Provider: SRIDHAR Torres CNP  Facility: [facility]  Facility Type:  [place]    Caller: Bailee   Call Back Number: [phone]    Allergies:    Allergies   Allergen Reactions     Codeine      Other reaction(s): GI Upset     Fludeoxyglucose      Wound Dressings      Adhesive Tape Rash     \"silk\"  tape     Contrast Dye Hives and Rash     Iodine Hives and Rash     Latex Rash     Other reaction(s): Intolerance-Can't Take  RED RASH, BANDAIDS OR TAPE OR GLOVES          Reason for call: Nursing is calling to give an update in regards to the pt's Rt sided facial swelling, pt was placed on keflex x7days for soft skin infection and otitis externa. The pt continues to have the swelling with no worsening or improvement. No fever, no redness or warmth.     Verbal Order/Direction given by Provider: Continue to monitor for any fever or worsening, NP to f/u next week.     Provider giving Order:  Bridgett Carreon MD    Verbal Order given to: Bailee Gallego RN      "

## 2023-03-20 ENCOUNTER — LAB REQUISITION (OUTPATIENT)
Dept: LAB | Facility: CLINIC | Age: 88
End: 2023-03-20
Payer: COMMERCIAL

## 2023-03-20 ENCOUNTER — TELEPHONE (OUTPATIENT)
Dept: GERIATRICS | Facility: CLINIC | Age: 88
End: 2023-03-20
Payer: COMMERCIAL

## 2023-03-20 DIAGNOSIS — R22.0 LOCALIZED SWELLING, MASS AND LUMP, HEAD: ICD-10-CM

## 2023-03-20 NOTE — TELEPHONE ENCOUNTER
"[facility] Nurse Triage Telephone Note    Provider: SRIDHAR Torres CNP  Facility: [facility]   Facility Type:  [place]    Caller: Bailee  Call Back Number: [phone]    Allergies   Allergen Reactions     Codeine      Other reaction(s): GI Upset     Fludeoxyglucose      Wound Dressings      Adhesive Tape Rash     \"silk\"  tape     Contrast Dye Hives and Rash     Iodine Hives and Rash     Latex Rash     Other reaction(s): Intolerance-Can't Take  RED RASH, BANDAIDS OR TAPE OR GLOVES         Reason for call: Swelling by the rt ear and face has increased. Entire area measures approx 3x5cm and the hardened area is 2x3cm. No fever, redness or warmth. Area is tender and painful with touch.     Verbal Order/Direction given by Provider:   - CBC w/ diff on 3/21/23  - Orbital/Sinus/Facial XR 2 views STAT for swelling    Provider giving Order:  SRIDHAR Torres CNP    Verbal Order given to: Bailee Parada RN  "

## 2023-03-21 ENCOUNTER — TELEPHONE (OUTPATIENT)
Dept: GERIATRICS | Facility: CLINIC | Age: 88
End: 2023-03-21
Payer: COMMERCIAL

## 2023-03-21 NOTE — TELEPHONE ENCOUNTER
"[facility] T[facility] Nurse Telephone Encounter    Provider: SRIDHAR Torres CNP  Facility: [facility]  Facility Type:  [place]    Caller: Bailee  Call Back Number: [phone]    Allergies:    Allergies   Allergen Reactions     Codeine      Other reaction(s): GI Upset     Fludeoxyglucose      Wound Dressings      Adhesive Tape Rash     \"silk\"  tape     Contrast Dye Hives and Rash     Iodine Hives and Rash     Latex Rash     Other reaction(s): Intolerance-Can't Take  RED RASH, BANDAIDS OR TAPE OR GLOVES          Reason for call: Nurse is calling to report facial x-ray results.  The facial x-rays were ordered due to swelling noted under the jaw line and there is now increased swelling under the right ear lobe.  The area of swelling is painful to touch.  No warmth noted.  Patient is afebrile.  See facial x-ray results below:          Verbal Order/Direction given by Provider: Augmentin 875mg Q 12 hours x 10 days for parotitis.  Encourage patient to suck on sour candy, such as lemon drops.       Provider giving Order:  SRIDHAR Torres CNP    Verbal Order given to: Bailee Philippe RN      "

## 2023-03-22 ENCOUNTER — ASSISTED LIVING VISIT (OUTPATIENT)
Dept: GERIATRICS | Facility: CLINIC | Age: 88
End: 2023-03-22
Payer: COMMERCIAL

## 2023-03-22 VITALS
HEART RATE: 80 BPM | RESPIRATION RATE: 20 BRPM | SYSTOLIC BLOOD PRESSURE: 118 MMHG | WEIGHT: 183.2 LBS | OXYGEN SATURATION: 92 % | TEMPERATURE: 97.8 F | HEIGHT: 64 IN | BODY MASS INDEX: 31.28 KG/M2 | DIASTOLIC BLOOD PRESSURE: 68 MMHG

## 2023-03-22 DIAGNOSIS — K11.21 PAROTITIS, ACUTE: Primary | ICD-10-CM

## 2023-03-22 DIAGNOSIS — Z53.9 DIAGNOSIS NOT YET DEFINED: Primary | ICD-10-CM

## 2023-03-22 PROCEDURE — 99349 HOME/RES VST EST MOD MDM 40: CPT | Performed by: NURSE PRACTITIONER

## 2023-03-22 PROCEDURE — G0179 MD RECERTIFICATION HHA PT: HCPCS | Performed by: NURSE PRACTITIONER

## 2023-03-22 NOTE — LETTER
"    3/22/2023        RE: Yanelis David  [address]  [place] [address]        [facility]    Chief Complaint   Patient presents with     RECHECK     Swollen right neck/ear     HPI:  Yanelis David is a 89 year old  (1/22/1933), who is being seen today for an episodic care visit at: [facility]) [[id number]]. Today's concern is: patient having increased right neck and cheek swelling, no improvement with warm compression and time. Thought to be possibly 2/2 recent covid-19 illness. Denies ear pain. No rash, warmth, redness noted. Mild pain with palpation. HPI difficult to obtain 2/2 baseline cognitive impairment. Started on Augmentin and sour candy yesterday for acute parotitis. No improvement at this time.     Allergies, and PMH/PSH reviewed in [facility] today.  REVIEW OF SYSTEMS:  Unobtainable secondary to cognitive impairment.     Objective:   /68   Pulse 80   Temp 97.8  F (36.6  C)   Resp 20   Ht 1.626 m (5' 4\")   Wt 83.1 kg (183 lb 3.2 oz)   SpO2 92%   BMI 31.45 kg/m    A & O x 1, NAD. Lungs CTA, non labored. RRR, S1/S2 w/o murmur,gallop or rub.  edema.  Abdomen soft, nontender, +BT'S. No focal neurological deficits.  swelling right neck and cheek      Recent labs in [facility] reviewed by me today.     Assessment/Plan:  (K11.21) Parotitis, acute  (primary encounter diagnosis)  Comment: Acute parotitis. Started on abx, sour candy and warm compression.   Plan:   -Continue Augmentin until completed.   -Warm compressions to right ear and face until resolves BID.         MED REC REQUIRED  Post Medication Reconciliation Status: patient was not discharged from an inpatient facility or TCU      Orders:  The current medical regimen is effective; continue present plan and medications.]          Electronically signed by:   Erica Shaw NP            Sincerely,        Erica Shaw NP    "

## 2023-03-22 NOTE — PROGRESS NOTES
"[facility] GERIATRICS    Chief Complaint   Patient presents with     RECHECK     Swollen right neck/ear     HPI:  Yanelis David is a 89 year old  (1/22/1933), who is being seen today for an episodic care visit at: [facility] ([facility]) [[id number]]. Today's concern is: patient having increased right neck and cheek swelling, no improvement with warm compression and time. Thought to be possibly 2/2 recent covid-19 illness. Denies ear pain. No rash, warmth, redness noted. Mild pain with palpation. HPI difficult to obtain 2/2 baseline cognitive impairment. Started on Augmentin and sour candy yesterday for acute parotitis. No improvement at this time.     Allergies, and PMH/PSH reviewed in [facility] today.  REVIEW OF SYSTEMS:  Unobtainable secondary to cognitive impairment.     Objective:   /68   Pulse 80   Temp 97.8  F (36.6  C)   Resp 20   Ht 1.626 m (5' 4\")   Wt 83.1 kg (183 lb 3.2 oz)   SpO2 92%   BMI 31.45 kg/m    A & O x 1, NAD. Lungs CTA, non labored. RRR, S1/S2 w/o murmur,gallop or rub.  edema.  Abdomen soft, nontender, +BT'S. No focal neurological deficits.  swelling right neck and cheek      Recent labs in [facility] reviewed by me today.     Assessment/Plan:  (K11.21) Parotitis, acute  (primary encounter diagnosis)  Comment: Acute parotitis. Started on abx, sour candy and warm compression.   Plan:   -Continue Augmentin until completed.   -Warm compressions to right ear and face until resolves BID.         MED REC REQUIRED  Post Medication Reconciliation Status: patient was not discharged from an inpatient facility or TCU      Orders:  The current medical regimen is effective; continue present plan and medications.]          Electronically signed by:   Erica Shaw NP      "

## 2023-03-28 LAB
BASOPHILS # BLD AUTO: 0.1 10E3/UL (ref 0–0.2)
BASOPHILS NFR BLD AUTO: 1 %
EOSINOPHIL # BLD AUTO: 0.2 10E3/UL (ref 0–0.7)
EOSINOPHIL NFR BLD AUTO: 2 %
ERYTHROCYTE [DISTWIDTH] IN BLOOD BY AUTOMATED COUNT: 14.5 % (ref 10–15)
HCT VFR BLD AUTO: 44.5 % (ref 35–47)
HGB BLD-MCNC: 14.6 G/DL (ref 11.7–15.7)
IMM GRANULOCYTES # BLD: 0 10E3/UL
IMM GRANULOCYTES NFR BLD: 0 %
LYMPHOCYTES # BLD AUTO: 1.4 10E3/UL (ref 0.8–5.3)
LYMPHOCYTES NFR BLD AUTO: 20 %
MCH RBC QN AUTO: 32.8 PG (ref 26.5–33)
MCHC RBC AUTO-ENTMCNC: 32.8 G/DL (ref 31.5–36.5)
MCV RBC AUTO: 100 FL (ref 78–100)
MONOCYTES # BLD AUTO: 0.7 10E3/UL (ref 0–1.3)
MONOCYTES NFR BLD AUTO: 9 %
NEUTROPHILS # BLD AUTO: 4.9 10E3/UL (ref 1.6–8.3)
NEUTROPHILS NFR BLD AUTO: 68 %
NRBC # BLD AUTO: 0 10E3/UL
NRBC BLD AUTO-RTO: 0 /100
PLATELET # BLD AUTO: 227 10E3/UL (ref 150–450)
RBC # BLD AUTO: 4.45 10E6/UL (ref 3.8–5.2)
WBC # BLD AUTO: 7.2 10E3/UL (ref 4–11)

## 2023-03-28 PROCEDURE — 36415 COLL VENOUS BLD VENIPUNCTURE: CPT | Mod: ORL | Performed by: NURSE PRACTITIONER

## 2023-03-28 PROCEDURE — 85025 COMPLETE CBC W/AUTO DIFF WBC: CPT | Mod: ORL | Performed by: NURSE PRACTITIONER

## 2023-03-28 PROCEDURE — P9603 ONE-WAY ALLOW PRORATED MILES: HCPCS | Mod: ORL | Performed by: NURSE PRACTITIONER

## 2023-03-30 ENCOUNTER — ASSISTED LIVING VISIT (OUTPATIENT)
Dept: GERIATRICS | Facility: CLINIC | Age: 88
End: 2023-03-30
Payer: COMMERCIAL

## 2023-03-30 VITALS
HEART RATE: 84 BPM | RESPIRATION RATE: 18 BRPM | DIASTOLIC BLOOD PRESSURE: 67 MMHG | WEIGHT: 180.4 LBS | TEMPERATURE: 96.9 F | SYSTOLIC BLOOD PRESSURE: 109 MMHG | BODY MASS INDEX: 30.8 KG/M2 | OXYGEN SATURATION: 93 % | HEIGHT: 64 IN

## 2023-03-30 DIAGNOSIS — R22.9 LOCALIZED SOFT TISSUE SWELLING: Primary | ICD-10-CM

## 2023-03-30 DIAGNOSIS — R52 PAIN: ICD-10-CM

## 2023-03-30 DIAGNOSIS — K11.20 PAROTITIS: ICD-10-CM

## 2023-03-30 PROCEDURE — 99350 HOME/RES VST EST HIGH MDM 60: CPT | Performed by: NURSE PRACTITIONER

## 2023-03-30 NOTE — LETTER
"    3/30/2023        RE: Yanelis David  [address]  [place] [address]        [facility] GERIATRICS    Chief Complaint   Patient presents with     RECHECK     Swelling of face     HPI:  Yanelis David is a 89 year old  (1/22/1933), who is being seen today for an episodic care visit at: [facility] ([facility]) [[id number]]. Today's concern is: patient continues to have soft tissue and parotid gland swelling. Completed course of Augmentin 875 mg/BID x10 and continues with sour candy and warm compression. Today, swelling is worse and tenderness remains. No further s/s noted and patient denies malaise. Labs were baseline. X-ray demonstrated chronic sinusitis otherwise a normal image.     Talked with daughter and ASCENCION Cortes and suggested CT of neck. Family agreed- has allergy to contrast dye so will be done without. Family will transport patient.     Allergies, and PMH/PSH reviewed in [facility] today.  REVIEW OF SYSTEMS:  Unobtainable secondary to cognitive impairment.     Objective:   /67   Pulse 84   Temp 96.9  F (36.1  C)   Resp 18   Ht 1.626 m (5' 4\")   Wt 81.8 kg (180 lb 6.4 oz)   SpO2 93%   BMI 30.97 kg/m    A & O x 3, NAD. Lungs CTA, non labored. RRR, S1/S2 w/o murmur,gallop or rub.  edema.  Abdomen soft, nontender, +BT'S. No focal neurological deficits.  soft tissue swelling right lateral neck, swelling of parotid gland.       Labs done in SNF are in [facility]. Please refer to them using [facility]/Care Everywhere. and Recent labs in [facility] reviewed by me today.     Assessment/Plan:    ICD-10-CM    1. Localized soft tissue swelling  R22.9 CT Soft Tissue Neck w/o Contrast     CANCELED: CT Soft Tissue Neck w Contrast      2. Parotitis  K11.20       3. Pain  R52       Patient continues to have acute right sided, soft tissue swelling of neck and parotid gland. Signs and symptoms are consistent with parotitis and patient completed course of Augmentin 875 mg/BID x10 days and is " sucking on sour candies. Symptoms have progressed and swelling is worse than last evaluation 8 days ago.   -CT of neck without contrast. Outpatient, family to transport.   -CBC with diff and peripheral smear.  -Continue warm compressions and sour candy.       MED REC REQUIRED  Post Medication Reconciliation Status: patient was not discharged from an inpatient facility or TCU      Orders:  1. CT of neck with without contrast- has soft tissue swelling that is progressing.   2. CBC with peripheral smear     Electronically signed by:   Erica Shaw NP            Sincerely,        Erica Shaw NP

## 2023-03-30 NOTE — PROGRESS NOTES
"[facility] GERIATRICS    Chief Complaint   Patient presents with     RECHECK     Swelling of face     HPI:  Yanelis David is a 89 year old  (1/22/1933), who is being seen today for an episodic care visit at: [facility] ([facility]) [[id number]]. Today's concern is: patient continues to have soft tissue and parotid gland swelling. Completed course of Augmentin 875 mg/BID x10 and continues with sour candy and warm compression. Today, swelling is worse and tenderness remains. No further s/s noted and patient denies malaise. Labs were baseline. X-ray demonstrated chronic sinusitis otherwise a normal image.     Talked with daughter and ASCENCION Cortes and suggested CT of neck. Family agreed- has allergy to contrast dye so will be done without. Family will transport patient.     Allergies, and PMH/PSH reviewed in [facility] today.  REVIEW OF SYSTEMS:  Unobtainable secondary to cognitive impairment.     Objective:   /67   Pulse 84   Temp 96.9  F (36.1  C)   Resp 18   Ht 1.626 m (5' 4\")   Wt 81.8 kg (180 lb 6.4 oz)   SpO2 93%   BMI 30.97 kg/m    A & O x 3, NAD. Lungs CTA, non labored. RRR, S1/S2 w/o murmur,gallop or rub.  edema.  Abdomen soft, nontender, +BT'S. No focal neurological deficits.  soft tissue swelling right lateral neck, swelling of parotid gland.       Labs done in SNF are in [facility]. Please refer to them using [facility]/Care Everywhere. and Recent labs in [facility] reviewed by me today.     Assessment/Plan:    ICD-10-CM    1. Localized soft tissue swelling  R22.9 CT Soft Tissue Neck w/o Contrast     CANCELED: CT Soft Tissue Neck w Contrast      2. Parotitis  K11.20       3. Pain  R52       Patient continues to have acute right sided, soft tissue swelling of neck and parotid gland. Signs and symptoms are consistent with parotitis and patient completed course of Augmentin 875 mg/BID x10 days and is sucking on sour candies. Symptoms have progressed and swelling is worse than last evaluation 8 days ago. "   -CT of neck without contrast. Outpatient, family to transport.   -CBC with diff and peripheral smear.  -Continue warm compressions and sour candy.       MED REC REQUIRED  Post Medication Reconciliation Status: patient was not discharged from an inpatient facility or TCU      Orders:  1. CT of neck with without contrast- has soft tissue swelling that is progressing.   2. CBC with peripheral smear     Electronically signed by:   Erica Shaw NP

## 2023-03-31 ENCOUNTER — LAB REQUISITION (OUTPATIENT)
Dept: LAB | Facility: CLINIC | Age: 88
End: 2023-03-31
Payer: COMMERCIAL

## 2023-04-04 LAB
BASOPHILS # BLD AUTO: 0.1 10E3/UL (ref 0–0.2)
BASOPHILS NFR BLD AUTO: 1 %
EOSINOPHIL # BLD AUTO: 0.2 10E3/UL (ref 0–0.7)
EOSINOPHIL NFR BLD AUTO: 2 %
ERYTHROCYTE [DISTWIDTH] IN BLOOD BY AUTOMATED COUNT: 14.4 % (ref 10–15)
HCT VFR BLD AUTO: 47.4 % (ref 35–47)
HGB BLD-MCNC: 15.4 G/DL (ref 11.7–15.7)
IMM GRANULOCYTES # BLD: 0 10E3/UL
IMM GRANULOCYTES NFR BLD: 0 %
LYMPHOCYTES # BLD AUTO: 1.4 10E3/UL (ref 0.8–5.3)
LYMPHOCYTES NFR BLD AUTO: 20 %
MCH RBC QN AUTO: 32.7 PG (ref 26.5–33)
MCHC RBC AUTO-ENTMCNC: 32.5 G/DL (ref 31.5–36.5)
MCV RBC AUTO: 101 FL (ref 78–100)
MONOCYTES # BLD AUTO: 0.5 10E3/UL (ref 0–1.3)
MONOCYTES NFR BLD AUTO: 7 %
NEUTROPHILS # BLD AUTO: 5 10E3/UL (ref 1.6–8.3)
NEUTROPHILS NFR BLD AUTO: 70 %
NRBC # BLD AUTO: 0 10E3/UL
NRBC BLD AUTO-RTO: 0 /100
PLATELET # BLD AUTO: 258 10E3/UL (ref 150–450)
RBC # BLD AUTO: 4.71 10E6/UL (ref 3.8–5.2)
WBC # BLD AUTO: 7.2 10E3/UL (ref 4–11)

## 2023-04-04 PROCEDURE — 36415 COLL VENOUS BLD VENIPUNCTURE: CPT | Mod: ORL | Performed by: NURSE PRACTITIONER

## 2023-04-04 PROCEDURE — 85025 COMPLETE CBC W/AUTO DIFF WBC: CPT | Mod: ORL | Performed by: NURSE PRACTITIONER

## 2023-04-04 PROCEDURE — P9603 ONE-WAY ALLOW PRORATED MILES: HCPCS | Mod: ORL | Performed by: NURSE PRACTITIONER

## 2023-04-06 ENCOUNTER — TELEPHONE (OUTPATIENT)
Dept: GERIATRICS | Facility: CLINIC | Age: 88
End: 2023-04-06
Payer: COMMERCIAL

## 2023-04-06 NOTE — TELEPHONE ENCOUNTER
"[facility] T[facility] Nurse Telephone Encounter    Provider: SRIDHAR Torres CNP  Facility: [facility]  Facility Type:  [place]    Caller: Nehal (ar radiology)   Call Back Number: [phone]    Allergies:    Allergies   Allergen Reactions     Codeine      Other reaction(s): GI Upset     Fludeoxyglucose      Wound Dressings      Adhesive Tape Rash     \"silk\"  tape     Contrast Dye Hives and Rash     Iodine Hives and Rash     Latex Rash     Other reaction(s): Intolerance-Can't Take  RED RASH, BANDAIDS OR TAPE OR GLOVES          Reason for call: Today I received a call from radiology concerned that the pt has a CT w/o contrast this afternoon and that the radiologist thought that this kind of CT would be more beneficial with contrast. The pt does have an allergy to contrast and if she was to have contrast she would need to be pre-medicated with Medrol 32mg 12h prior then 32mg 2 hrs prior followed by benadryl 50mg 1h prior.     After talking with Dr. Carreon and the Daugher (Sophia) they are ok with canceling the CT at the moment and Dr. Carreon and Erica Shaw will have a conversation soon about the most appropriate treatment.     [facility] nursing staff have been made aware as well.     Verbal Order/Direction given by Provider: Cancel CT a the moment     Provider giving Order:  Bridgett Carreon MD, CHARISSE      "

## 2023-04-27 DIAGNOSIS — I10 BENIGN ESSENTIAL HYPERTENSION: ICD-10-CM

## 2023-04-27 DIAGNOSIS — F33.9 DEPRESSION, RECURRENT (H): ICD-10-CM

## 2023-04-27 DIAGNOSIS — G47.09 OTHER INSOMNIA: Primary | ICD-10-CM

## 2023-04-29 RX ORDER — LISINOPRIL 2.5 MG/1
TABLET ORAL
Qty: 90 TABLET | Refills: 97 | Status: SHIPPED | OUTPATIENT
Start: 2023-04-29 | End: 2024-01-01

## 2023-04-29 RX ORDER — SERTRALINE HYDROCHLORIDE 25 MG/1
TABLET, FILM COATED ORAL
Qty: 90 TABLET | Refills: 97 | Status: SHIPPED | OUTPATIENT
Start: 2023-04-29 | End: 2024-01-01

## 2023-06-18 ENCOUNTER — HEALTH MAINTENANCE LETTER (OUTPATIENT)
Age: 88
End: 2023-06-18

## 2023-06-30 DIAGNOSIS — Z78.9 TAKES DIETARY SUPPLEMENTS: ICD-10-CM

## 2023-06-30 RX ORDER — CALCIUM CARBONATE/VITAMIN D3 600 MG-10
TABLET ORAL
Qty: 90 TABLET | Refills: 3 | Status: SHIPPED | OUTPATIENT
Start: 2023-06-30 | End: 2024-01-01

## 2023-08-10 ENCOUNTER — TELEPHONE (OUTPATIENT)
Dept: GERIATRICS | Facility: CLINIC | Age: 88
End: 2023-08-10
Payer: COMMERCIAL

## 2023-08-10 RX ORDER — TROLAMINE SALICYLATE 10 G/100G
1 CREAM TOPICAL 2 TIMES DAILY
COMMUNITY
Start: 2023-08-10

## 2023-08-10 NOTE — TELEPHONE ENCOUNTER
"[facility] Triage Nurse Telephone Encounter    Provider: SRIDHAR Torres CNP  Facility: [facility]  Facility Type:  [place]    Caller: Bailee  Call Back Number: [phone]    Allergies:    Allergies   Allergen Reactions     Codeine      Other reaction(s): GI Upset     Fludeoxyglucose F 18      Wound Dressings      Adhesive Tape Rash     \"silk\"  tape     Contrast Dye Hives and Rash     Iodine Hives and Rash     Latex Rash     Other reaction(s): Intolerance-Can't Take  RED RASH, BANDAIDS OR TAPE OR GLOVES          Reason for call: Nurse is calling to report that patient is having right thigh and knee pain.  Nurse stated that patient started walking more as of 7/28/23.  Since then, her pain has increased.  No falls noted.  The right thigh pain is noted to be at rest and the right knee pain is noted only with movement.  Patient currently receives Tylenol 1000mg BID and daily PRN.      Verbal Order/Direction given by Provider: Hold walking until pain is more under control.  Voltaren 1% gel---apply 4g TID to right knee.  Aspercreme 10%---apply to right thigh from hip to knee BID.      Provider giving Order:  Bridgett Carreon MD    Verbal Order given to: Bailee Philippe RN      "

## 2023-08-16 NOTE — PROGRESS NOTES
"[facility] GERIATRICS    Chief Complaint   Patient presents with    RECHECK     HPI:  Yanelis David is a 89 year old  (1/22/1933), who is being seen today for an episodic care visit at: [facility]) [[id number]]. Today's concern is: Complaining of intermittent right knee pain. HPI difficult to obtain 2/2 severe cognitive impairment. No fall reported or injury noted to knee. No swelling, bruising on exam. No further concerns.     Allergies, and PMH/PSH reviewed in [facility] today.  REVIEW OF SYSTEMS:  Unobtainable secondary to cognitive impairment.     Objective:   /60   Pulse 54   Temp (!) 96.2  F (35.7  C)   Resp 18   Ht 1.626 m (5' 4\")   Wt 81.6 kg (180 lb)   BMI 30.90 kg/m    GENERAL APPEARANCE:  Alert, in no distress  ENT:  Mouth and posterior oropharynx normal, moist mucous membranes  CV:  Palpation and auscultation of heart done , regular rate and rhythm, no murmur, rub, or gallop  M/S:   Gait and station normal  Digits and nails normal  NEURO:   Cranial nerves 2-12 are normal tested and grossly at patient's baseline  PSYCH:  memory impaired , affect and mood normal    Recent labs in [facility] reviewed by me today.     Assessment/Plan:  (M25.561) Acute pain of right knee  (primary encounter diagnosis)  Comment: Actue/chronic right knee pain without trauma or injury noted.   -X-ray of right knee 2 view.   -Update NP with any changes.     MED REC REQUIRED  Post Medication Reconciliation Status: patient was not discharged from an inpatient facility or TCU      Right x-ray of knee 2 view     Electronically signed by: Erica Shaw NP        "

## 2023-08-16 NOTE — LETTER
"    8/16/2023        RE: Yanelis David  [address] [address]        [facility] GERIATRICS    Chief Complaint   Patient presents with     RECHECK     HPI:  Yanelis David is a 89 year old  (1/22/1933), who is being seen today for an episodic care visit at: [facility] ([facility]) [[id number]]. Today's concern is: Complaining of intermittent right knee pain. HPI difficult to obtain 2/2 severe cognitive impairment. No fall reported or injury noted to knee. No swelling, bruising on exam. No further concerns.     Allergies, and PMH/PSH reviewed in [facility].  REVIEW OF SYSTEMS:  Unobtainable secondary to cognitive impairment.     Objective:   /60   Pulse 54   Temp (!) 96.2  F (35.7  C)   Resp 18   Ht 1.626 m (5' 4\")   Wt 81.6 kg (180 lb)   BMI 30.90 kg/m    GENERAL APPEARANCE:  Alert, in no distress  ENT:  Mouth and posterior oropharynx normal, moist mucous membranes  CV:  Palpation and auscultation of heart done , regular rate and rhythm, no murmur, rub, or gallop  M/S:   Gait and station normal  Digits and nails normal  NEURO:   Cranial nerves 2-12 are normal tested and grossly at patient's baseline  PSYCH:  memory impaired , affect and mood normal    Recent labs in [facility] reviewed by me today.     Assessment/Plan:  (M25.561) Acute pain of right knee  (primary encounter diagnosis)  Comment: Actue/chronic right knee pain without trauma or injury noted.   -X-ray of right knee 2 view.   -Update NP with any changes.     MED REC REQUIRED  Post Medication Reconciliation Status: patient was not discharged from an inpatient facility or TCU      Right x-ray of knee 2 view     Electronically signed by: Erica Shaw NP          Sincerely,        Erica Shaw NP      "

## 2023-08-22 NOTE — TELEPHONE ENCOUNTER
[facility] NON-EMERGENT ENCOUNTER    Yanelis David is a 89 year old  (1/22/1933), phone call received today regarding: unwitnessed fall    Disposition  Pt reports tripping in her apartment and landing on her left side. Pain in right hip with movement - ROM intact and able to bear weight. Also reporting low back pain; however daughter states she was complaining of that pain the day prior to fall. Vitals: /75, P 93, O2 98%, T 98.4, R 20. Possible increase in confusion; pt is in memory care. Facility will continue to monitor per protocol. PCP updated.    Electronically signed by:   Allison Wright RN

## 2023-12-06 NOTE — TELEPHONE ENCOUNTER
"[facility] Triage Nurse Telephone Encounter    Provider: SRIDHAR Torres CNP  Facility: [facility]  Facility Type:  [place]    Caller: Dolly  Call Back Number: [phone]    Allergies:    Allergies   Allergen Reactions    Codeine      Other reaction(s): GI Upset    Fludeoxyglucose F 18     Wound Dressings     Adhesive Tape Rash     \"silk\"  tape    Contrast Dye Hives and Rash    Iodine Hives and Rash    Latex Rash     Other reaction(s): Intolerance-Can't Take  RED RASH, BANDAIDS OR TAPE OR GLOVES          Reason for call: Nurse is calling on behalf of patient's family request to have earwax removed from patient's ears due to difficulty hearing.      Verbal Order/Direction given by Provider: Starting on 12/11/23, give Debrox 5 drops to both ears BID x 4 days.  NP to irrigate ears on 12/15/23.      Provider giving Order:  SRIDHAR Torres CNP    Verbal Order given to: Yoselyn Philippe RN      "

## 2023-12-22 NOTE — PROGRESS NOTES
"[facility] GERIATRICS    Chief Complaint   Patient presents with    Nursing Home Acute     HPI:  Yanelis David is a 89 year old  (1/22/1933), who is being seen today for an episodic care visit at: [facility]) [[id number]]. Today's concern is: family requesting ear flushing 2/2 bilateral impacted cerumen.   -Patient did not tolerate procedure. Reports she is refusing to continue and doesn't agree with who gave orders to flush.   -No results noted.     Allergies, and PMH/PSH reviewed in [facility] today.  REVIEW OF SYSTEMS:  Unobtainable secondary to cognitive impairment.     Objective:   /70   Pulse 82   Temp 97.1  F (36.2  C)   Resp 18   Ht 1.626 m (5' 4\")   Wt 88.9 kg (196 lb)   SpO2 97%   BMI 33.64 kg/m    A & O x 1, NAD. Lungs CTA, non labored. RRR, S1/S2 w/o murmur,gallop or rub.  edema.  Abdomen soft, nontender, +BT'S. No focal neurological deficits.        Recent labs in [facility] reviewed by me today.     Assessment/Plan:  (H61.23) Bilateral impacted cerumen  (primary encounter diagnosis)  -Attempted to flush bilateral ears, patient did not tolerate procedure.   -Will notify family of no further ear flushing in future.     MED REC REQUIRED  Post Medication Reconciliation Status: patient was not discharged from an inpatient facility or TCU      Orders:  See above, otherwise the current medical regimen is effective;  continue present plan and medications.    Electronically signed by:   Erica Shaw NP       "

## 2023-12-22 NOTE — LETTER
"    12/22/2023        RE: Yanelis David  [address] [address]        [facility] GERIATRICS    Chief Complaint   Patient presents with     Nursing Home Acute     HPI:  Yanelis David is a 89 year old  (1/22/1933), who is being seen today for an episodic care visit at: [facility] ([facility]) [[id number]]. Today's concern is: family requesting ear flushing 2/2 bilateral impacted cerumen.   -Patient did not tolerate procedure. Reports she is refusing to continue and doesn't agree with who gave orders to flush.   -No results noted.     Allergies, and PMH/PSH reviewed in [facility] today.  REVIEW OF SYSTEMS:  Unobtainable secondary to cognitive impairment.     Objective:   /70   Pulse 82   Temp 97.1  F (36.2  C)   Resp 18   Ht 1.626 m (5' 4\")   Wt 88.9 kg (196 lb)   SpO2 97%   BMI 33.64 kg/m    A & O x 1, NAD. Lungs CTA, non labored. RRR, S1/S2 w/o murmur,gallop or rub.  edema.  Abdomen soft, nontender, +BT'S. No focal neurological deficits.        Recent labs in [facility] reviewed by me today.     Assessment/Plan:  (H61.23) Bilateral impacted cerumen  (primary encounter diagnosis)  -Attempted to flush bilateral ears, patient did not tolerate procedure.   -Will notify family of no further ear flushing in future.     MED REC REQUIRED  Post Medication Reconciliation Status: patient was not discharged from an inpatient facility or TCU      Orders:  See above, otherwise the current medical regimen is effective;  continue present plan and medications.    Electronically signed by:   Erica Shaw NP           Sincerely,        Erica Shaw NP      "

## 2024-01-01 ENCOUNTER — TELEPHONE (OUTPATIENT)
Dept: GERIATRICS | Facility: CLINIC | Age: 89
End: 2024-01-01
Payer: COMMERCIAL

## 2024-01-01 ENCOUNTER — ASSISTED LIVING VISIT (OUTPATIENT)
Dept: GERIATRICS | Facility: CLINIC | Age: 89
End: 2024-01-01
Payer: COMMERCIAL

## 2024-01-01 ENCOUNTER — HEALTH MAINTENANCE LETTER (OUTPATIENT)
Age: 89
End: 2024-01-01

## 2024-01-01 VITALS
BODY MASS INDEX: 39.27 KG/M2 | RESPIRATION RATE: 20 BRPM | WEIGHT: 230 LBS | HEIGHT: 64 IN | TEMPERATURE: 97.6 F | SYSTOLIC BLOOD PRESSURE: 127 MMHG | OXYGEN SATURATION: 97 % | DIASTOLIC BLOOD PRESSURE: 98 MMHG | HEART RATE: 71 BPM

## 2024-01-01 DIAGNOSIS — H01.009 BLEPHARITIS: Primary | ICD-10-CM

## 2024-01-01 DIAGNOSIS — G30.1 MODERATE LATE ONSET ALZHEIMER'S DEMENTIA WITHOUT BEHAVIORAL DISTURBANCE, PSYCHOTIC DISTURBANCE, MOOD DISTURBANCE, OR ANXIETY (H): ICD-10-CM

## 2024-01-01 DIAGNOSIS — I10 BENIGN ESSENTIAL HYPERTENSION: ICD-10-CM

## 2024-01-01 DIAGNOSIS — Z78.9 TAKES DIETARY SUPPLEMENTS: ICD-10-CM

## 2024-01-01 DIAGNOSIS — U07.1 INFECTION DUE TO 2019 NOVEL CORONAVIRUS: Primary | ICD-10-CM

## 2024-01-01 DIAGNOSIS — F33.9 DEPRESSION, RECURRENT (H): ICD-10-CM

## 2024-01-01 DIAGNOSIS — G47.09 OTHER INSOMNIA: ICD-10-CM

## 2024-01-01 DIAGNOSIS — J43.8 OTHER EMPHYSEMA (H): ICD-10-CM

## 2024-01-01 DIAGNOSIS — H61.23 CERUMINOSIS, BILATERAL: Primary | ICD-10-CM

## 2024-01-01 DIAGNOSIS — M54.6 PAIN IN THORACIC SPINE: ICD-10-CM

## 2024-01-01 DIAGNOSIS — F02.B0 MODERATE LATE ONSET ALZHEIMER'S DEMENTIA WITHOUT BEHAVIORAL DISTURBANCE, PSYCHOTIC DISTURBANCE, MOOD DISTURBANCE, OR ANXIETY (H): ICD-10-CM

## 2024-01-01 PROCEDURE — 99349 HOME/RES VST EST MOD MDM 40: CPT | Performed by: NURSE PRACTITIONER

## 2024-01-01 RX ORDER — PSEUDOEPHED/ACETAMINOPH/DIPHEN 30MG-500MG
TABLET ORAL
Qty: 540 TABLET | Refills: 3 | Status: SHIPPED | OUTPATIENT
Start: 2024-01-01

## 2024-01-01 RX ORDER — CALCIUM CARBONATE/VITAMIN D3 600 MG-10
TABLET ORAL
Qty: 90 TABLET | Refills: 97 | Status: SHIPPED | OUTPATIENT
Start: 2024-01-01

## 2024-01-01 RX ORDER — LISINOPRIL 2.5 MG/1
TABLET ORAL
Qty: 90 TABLET | Refills: 97 | Status: SHIPPED | OUTPATIENT
Start: 2024-01-01

## 2024-01-01 RX ORDER — SERTRALINE HYDROCHLORIDE 25 MG/1
TABLET, FILM COATED ORAL
Qty: 90 TABLET | Refills: 97 | Status: SHIPPED | OUTPATIENT
Start: 2024-01-01

## 2024-01-01 RX ORDER — ERYTHROMYCIN 5 MG/G
0.5 OINTMENT OPHTHALMIC AT BEDTIME
Status: SHIPPED
Start: 2024-01-01 | End: 2024-01-01

## 2024-02-23 NOTE — LETTER
"    2/23/2024        RE: Yanelis David  [address] [address]        [facility] GERIATRICS    Chief Complaint   Patient presents with     RECHECK     Covid +     HPI:  Yanelis David is a 89 year old  (1/22/1933), who is being seen today for an episodic care visit at: [facility] [[id number]]. Today's concern is:   -Patient resting in room, tested positive for covid-19 today. Symptoms of nasal congestion and mild cough. HPI difficult to obtain given baseline cognitive impairment.     BP Readings from Last 3 Encounters:   02/23/24 (!) 127/98   12/22/23 114/70   12/22/23 114/70     Pulse Readings from Last 4 Encounters:   02/23/24 71   12/22/23 82   12/22/23 82   12/15/23 82     Wt Readings from Last 4 Encounters:   02/23/24 104.3 kg (230 lb)   12/22/23 88.9 kg (196 lb)   12/22/23 89.1 kg (196 lb 6.4 oz)   12/15/23 89.1 kg (196 lb 6.4 oz)         Allergies, and PMH/PSH reviewed in [facility] today.  REVIEW OF SYSTEMS:  Unobtainable secondary to cognitive impairment.     Objective:   BP (!) 127/98   Pulse 71   Temp 97.6  F (36.4  C)   Resp 20   Ht 1.626 m (5' 4\")   Wt 104.3 kg (230 lb)   SpO2 97%   BMI 39.48 kg/m    A & O x 1, NAD. Lungs CTA, non labored. RRR, S1/S2 w/o murmur,gallop or rub.  no edema.  Abdomen soft, nontender, +BT'S. No focal neurological deficits.  +cough      Recent labs in [facility] reviewed by me today.     Assessment/Plan:     Infection due to 2019 novel coronavirus  Moderate late onset Alzheimer's dementia without behavioral disturbance, psychotic disturbance, mood disturbance, or anxiety (H)  Other emphysema (H)  Depression, recurrent (H24)  Provider reviewed records from facility, and interpreted most recent imaging/lab work, and vital signs.   Covid-19 positive  Molnupiravir 800 mg PO BID x5 days   Isolation restrictions per facility policies.   Monitor s/symptom and update NP as indicated.   Continue [facility] support with medication administration, meals " and safety. Expect further decline with progression of disease including weight loss. Mood and behavior baseline.     MED REC REQUIRED  Post Medication Reconciliation Status: patient was not discharged from an inpatient facility or TCU      Orders:  See new orders above     Electronically signed by: Erica Shaw NP           Sincerely,        Erica Shaw NP

## 2024-02-23 NOTE — PROGRESS NOTES
"[facility] GERIATRICS    Chief Complaint   Patient presents with    RECHECK     Covid +     HPI:  Yanelis David is a 89 year old  (1/22/1933), who is being seen today for an episodic care visit at: [facility]) [[id number]]. Today's concern is:   -Patient resting in room, tested positive for covid-19 today. Symptoms of nasal congestion and mild cough. HPI difficult to obtain given baseline cognitive impairment.     BP Readings from Last 3 Encounters:   02/23/24 (!) 127/98   12/22/23 114/70   12/22/23 114/70     Pulse Readings from Last 4 Encounters:   02/23/24 71   12/22/23 82   12/22/23 82   12/15/23 82     Wt Readings from Last 4 Encounters:   02/23/24 104.3 kg (230 lb)   12/22/23 88.9 kg (196 lb)   12/22/23 89.1 kg (196 lb 6.4 oz)   12/15/23 89.1 kg (196 lb 6.4 oz)         Allergies, and PMH/PSH reviewed in [facility] today.  REVIEW OF SYSTEMS:  Unobtainable secondary to cognitive impairment.     Objective:   BP (!) 127/98   Pulse 71   Temp 97.6  F (36.4  C)   Resp 20   Ht 1.626 m (5' 4\")   Wt 104.3 kg (230 lb)   SpO2 97%   BMI 39.48 kg/m    A & O x 1, NAD. Lungs CTA, non labored. RRR, S1/S2 w/o murmur,gallop or rub.  no edema.  Abdomen soft, nontender, +BT'S. No focal neurological deficits.  +cough      Recent labs in [facility] reviewed by me today.     Assessment/Plan:     Infection due to 2019 novel coronavirus  Moderate late onset Alzheimer's dementia without behavioral disturbance, psychotic disturbance, mood disturbance, or anxiety (H)  Other emphysema (H)  Depression, recurrent (H24)  Provider reviewed records from facility, and interpreted most recent imaging/lab work, and vital signs.   Covid-19 positive  Molnupiravir 800 mg PO BID x5 days   Isolation restrictions per facility policies.   Monitor s/symptom and update NP as indicated.   Continue  support with medication administration, meals and safety. Expect further decline with progression of disease including weight loss. Mood " and behavior baseline.     MED REC REQUIRED  Post Medication Reconciliation Status: patient was not discharged from an inpatient facility or TCU      Orders:  See new orders above     Electronically signed by: Erica Shaw NP

## 2024-02-25 PROBLEM — E66.812 CLASS 2 SEVERE OBESITY DUE TO EXCESS CALORIES WITH SERIOUS COMORBIDITY IN ADULT (H): Status: ACTIVE | Noted: 2024-01-01

## 2024-02-25 PROBLEM — E66.01 CLASS 2 SEVERE OBESITY DUE TO EXCESS CALORIES WITH SERIOUS COMORBIDITY IN ADULT (H): Status: ACTIVE | Noted: 2024-01-01

## 2024-07-10 NOTE — TELEPHONE ENCOUNTER
"[facility] Triage Nurse Telephone Encounter    Provider: SRIDHAR Torres CNP  Facility: [facility]  Facility Type:  [place]    Caller: Aneesh  Call Back Number: [phone]    Allergies:    Allergies   Allergen Reactions    Codeine      Other reaction(s): GI Upset    Fludeoxyglucose F 18     Wound Dressings     Adhesive Tape Rash     \"silk\"  tape    Contrast Dye Hives and Rash    Iodine Hives and Rash    Latex Rash     Other reaction(s): Intolerance-Can't Take  RED RASH, BANDAIDS OR TAPE OR GLOVES          Reason for call: Today nursing noted pt to have increased puffiness underneath the right eyelid. On the outer aspect of the upper eyelid there appears to be some light colored drainage. Sclera is slightly pink/reddened. Pt reports some itching. They have applied a moist compress and cleansed the eye for now.     Verbal Order/Direction given by Provider:   - Erythromycin 5MG/GM ophthalmic ointment Apply 0.5 inch ribbon to right lower eyelid daily at bedtime for 7 days    Provider giving Order:  SRIDHAR Torres CNP    Verbal Order given to: Dolly Parada RN      "

## 2024-07-25 NOTE — TELEPHONE ENCOUNTER
Yanelis David   1933    Orders 2024:  Debrox 5 drops to both ears bid X 5 days for ceruminosis  Sent to pharmacy      SRIDHAR Domingo CNP on 2024 at 6:02 PM